# Patient Record
Sex: FEMALE | Race: WHITE | Employment: UNEMPLOYED | URBAN - METROPOLITAN AREA
[De-identification: names, ages, dates, MRNs, and addresses within clinical notes are randomized per-mention and may not be internally consistent; named-entity substitution may affect disease eponyms.]

---
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
## 2023-10-22 ENCOUNTER — HOSPITAL ENCOUNTER (EMERGENCY)
Age: 18
Discharge: HOME | End: 2023-10-22
Attending: STUDENT IN AN ORGANIZED HEALTH CARE EDUCATION/TRAINING PROGRAM

## 2023-10-22 VITALS
DIASTOLIC BLOOD PRESSURE: 63 MMHG | WEIGHT: 146 LBS | BODY MASS INDEX: 24.32 KG/M2 | RESPIRATION RATE: 16 BRPM | OXYGEN SATURATION: 100 % | HEART RATE: 58 BPM | HEIGHT: 65 IN | TEMPERATURE: 97.7 F | SYSTOLIC BLOOD PRESSURE: 106 MMHG

## 2023-10-22 DIAGNOSIS — O21.9 NAUSEA AND VOMITING IN PREGNANCY: Primary | ICD-10-CM

## 2023-10-22 LAB
ALBUMIN SERPL-MCNC: 4.6 G/DL (ref 3.4–5)
ALBUMIN/GLOB SERPL: 1.5 {RATIO} (ref 1.1–2.2)
ALP SERPL-CCNC: 48 U/L (ref 40–129)
ALT SERPL-CCNC: 31 U/L (ref 10–40)
ANION GAP SERPL CALCULATED.3IONS-SCNC: 17 MMOL/L (ref 3–16)
AST SERPL-CCNC: 23 U/L (ref 15–37)
B-HCG SERPL EIA 3RD IS-ACNC: NORMAL MIU/ML
BASOPHILS # BLD: 0 K/UL (ref 0–0.2)
BASOPHILS NFR BLD: 0.4 %
BILIRUB SERPL-MCNC: 0.7 MG/DL (ref 0–1)
BILIRUB UR QL STRIP.AUTO: NEGATIVE
BUN SERPL-MCNC: 13 MG/DL (ref 7–20)
CALCIUM SERPL-MCNC: 9.7 MG/DL (ref 8.3–10.6)
CHLORIDE SERPL-SCNC: 100 MMOL/L (ref 99–110)
CLARITY UR: CLEAR
CO2 SERPL-SCNC: 18 MMOL/L (ref 21–32)
COLOR UR: YELLOW
CREAT SERPL-MCNC: 0.6 MG/DL (ref 0.6–1.1)
DEPRECATED RDW RBC AUTO: 13.5 % (ref 12.4–15.4)
EOSINOPHIL # BLD: 0 K/UL (ref 0–0.6)
EOSINOPHIL NFR BLD: 0.5 %
GFR SERPLBLD CREATININE-BSD FMLA CKD-EPI: >60 ML/MIN/{1.73_M2}
GLUCOSE SERPL-MCNC: 91 MG/DL (ref 70–99)
GLUCOSE UR STRIP.AUTO-MCNC: NEGATIVE MG/DL
HCG SERPL QL: POSITIVE
HCT VFR BLD AUTO: 41.8 % (ref 36–48)
HGB BLD-MCNC: 14.1 G/DL (ref 12–16)
HGB UR QL STRIP.AUTO: NEGATIVE
KETONES UR STRIP.AUTO-MCNC: >=80 MG/DL
LEUKOCYTE ESTERASE UR QL STRIP.AUTO: NEGATIVE
LIPASE SERPL-CCNC: 20 U/L (ref 13–60)
LYMPHOCYTES # BLD: 1.4 K/UL (ref 1–5.1)
LYMPHOCYTES NFR BLD: 14 %
MCH RBC QN AUTO: 30 PG (ref 26–34)
MCHC RBC AUTO-ENTMCNC: 33.8 G/DL (ref 31–36)
MCV RBC AUTO: 88.8 FL (ref 80–100)
MONOCYTES # BLD: 0.5 K/UL (ref 0–1.3)
MONOCYTES NFR BLD: 4.7 %
MUCOUS THREADS #/AREA URNS LPF: ABNORMAL /LPF
NEUTROPHILS # BLD: 7.8 K/UL (ref 1.7–7.7)
NEUTROPHILS NFR BLD: 80.4 %
NITRITE UR QL STRIP.AUTO: NEGATIVE
PH UR STRIP.AUTO: 6 [PH] (ref 5–8)
PLATELET # BLD AUTO: 252 K/UL (ref 135–450)
PMV BLD AUTO: 9.1 FL (ref 5–10.5)
POTASSIUM SERPL-SCNC: 4 MMOL/L (ref 3.5–5.1)
PROT SERPL-MCNC: 7.6 G/DL (ref 6.4–8.2)
PROT UR STRIP.AUTO-MCNC: ABNORMAL MG/DL
RBC # BLD AUTO: 4.71 M/UL (ref 4–5.2)
RBC #/AREA URNS HPF: ABNORMAL /HPF (ref 0–4)
SODIUM SERPL-SCNC: 135 MMOL/L (ref 136–145)
SP GR UR STRIP.AUTO: >=1.03 (ref 1–1.03)
UA COMPLETE W REFLEX CULTURE PNL UR: ABNORMAL
UA DIPSTICK W REFLEX MICRO PNL UR: YES
URN SPEC COLLECT METH UR: ABNORMAL
UROBILINOGEN UR STRIP-ACNC: 0.2 E.U./DL
WBC # BLD AUTO: 9.7 K/UL (ref 4–11)
WBC #/AREA URNS HPF: ABNORMAL /HPF (ref 0–5)

## 2023-10-22 PROCEDURE — 80053 COMPREHEN METABOLIC PANEL: CPT

## 2023-10-22 PROCEDURE — 84703 CHORIONIC GONADOTROPIN ASSAY: CPT

## 2023-10-22 PROCEDURE — 81001 URINALYSIS AUTO W/SCOPE: CPT

## 2023-10-22 PROCEDURE — 99284 EMERGENCY DEPT VISIT MOD MDM: CPT

## 2023-10-22 PROCEDURE — 84702 CHORIONIC GONADOTROPIN TEST: CPT

## 2023-10-22 PROCEDURE — 85025 COMPLETE CBC W/AUTO DIFF WBC: CPT

## 2023-10-22 PROCEDURE — 6360000002 HC RX W HCPCS: Performed by: STUDENT IN AN ORGANIZED HEALTH CARE EDUCATION/TRAINING PROGRAM

## 2023-10-22 PROCEDURE — 2580000003 HC RX 258: Performed by: STUDENT IN AN ORGANIZED HEALTH CARE EDUCATION/TRAINING PROGRAM

## 2023-10-22 PROCEDURE — 83690 ASSAY OF LIPASE: CPT

## 2023-10-22 PROCEDURE — 96374 THER/PROPH/DIAG INJ IV PUSH: CPT

## 2023-10-22 RX ORDER — SODIUM CHLORIDE, SODIUM LACTATE, POTASSIUM CHLORIDE, AND CALCIUM CHLORIDE .6; .31; .03; .02 G/100ML; G/100ML; G/100ML; G/100ML
1000 INJECTION, SOLUTION INTRAVENOUS ONCE
Status: COMPLETED | OUTPATIENT
Start: 2023-10-22 | End: 2023-10-22

## 2023-10-22 RX ORDER — ONDANSETRON 4 MG/1
4 TABLET, FILM COATED ORAL 3 TIMES DAILY PRN
Qty: 15 TABLET | Refills: 0 | Status: SHIPPED | OUTPATIENT
Start: 2023-10-22

## 2023-10-22 RX ORDER — DEXTROSE MONOHYDRATE 50 MG/ML
500 INJECTION, SOLUTION INTRAVENOUS ONCE
Status: COMPLETED | OUTPATIENT
Start: 2023-10-22 | End: 2023-10-22

## 2023-10-22 RX ORDER — LANOLIN ALCOHOL/MO/W.PET/CERES
25 CREAM (GRAM) TOPICAL DAILY
Qty: 30 TABLET | Refills: 3 | Status: SHIPPED | OUTPATIENT
Start: 2023-10-22

## 2023-10-22 RX ORDER — ONDANSETRON 2 MG/ML
4 INJECTION INTRAMUSCULAR; INTRAVENOUS ONCE
Status: COMPLETED | OUTPATIENT
Start: 2023-10-22 | End: 2023-10-22

## 2023-10-22 RX ADMIN — SODIUM CHLORIDE, POTASSIUM CHLORIDE, SODIUM LACTATE AND CALCIUM CHLORIDE 1000 ML: 600; 310; 30; 20 INJECTION, SOLUTION INTRAVENOUS at 11:24

## 2023-10-22 RX ADMIN — ONDANSETRON 4 MG: 2 INJECTION INTRAMUSCULAR; INTRAVENOUS at 11:21

## 2023-10-22 RX ADMIN — DEXTROSE MONOHYDRATE 500 ML: 50 INJECTION, SOLUTION INTRAVENOUS at 11:27

## 2023-10-22 ASSESSMENT — LIFESTYLE VARIABLES
HOW OFTEN DO YOU HAVE A DRINK CONTAINING ALCOHOL: NEVER
HOW MANY STANDARD DRINKS CONTAINING ALCOHOL DO YOU HAVE ON A TYPICAL DAY: PATIENT DOES NOT DRINK

## 2023-10-22 ASSESSMENT — PAIN - FUNCTIONAL ASSESSMENT: PAIN_FUNCTIONAL_ASSESSMENT: 0-10

## 2023-10-22 ASSESSMENT — PAIN SCALES - GENERAL: PAINLEVEL_OUTOF10: 3

## 2023-10-22 NOTE — ED PROVIDER NOTES
8.0    Protein, UA TRACE (A) Negative mg/dL    Urobilinogen, Urine 0.2 <2.0 E.U./dL    Nitrite, Urine Negative Negative    Leukocyte Esterase, Urine Negative Negative    Microscopic Examination YES     Urine Type NotGiven     Urine Reflex to Culture Not Indicated    HCG Qualitative, Serum   Result Value Ref Range    hCG Qual POSITIVE Detects HCG level >10 MIU/mL   HCG, Quantitative, Pregnancy   Result Value Ref Range    hCG Quant 68761.0 <5.0 mIU/mL   Microscopic Urinalysis   Result Value Ref Range    Mucus, UA 1+ (A) None Seen /LPF    WBC, UA 0-2 0 - 5 /HPF    RBC, UA 0-2 0 - 4 /HPF         - Patient seen and evaluated in room 06.  19 y.o. female presented for nausea in setting of presumed pregnancy. Patient presented with normal vitals. On exam she had no abdominal tenderness, no rebound or guarding. She did have dry mucous membranes. Given history and exam my differential diagnosis includes but is not limited to hyperemesis gravidarum, dehydration, electrolyte abnormalities, UTI. She has no vaginal bleeding, no pelvic pain to suggest underlying ruptured ectopic, ovarian torsion, PID or acute surgical abdomen. She had no rebound or guarding. I did not obtain an US given no abdominal pain, no vaginal bleeding or discharge. - Patient was placed on telemetry during her ED stay and no malignant dysrhythmia observed. - Pertinent old records reviewed. - Chronic medical conditions: Depression, PTSD  - Social determinants: None significant  - Patient was given 1 L of IV fluid, 5% dextrose, 4 mg IV Zofran in the ED. Upon reassessment, patient with symptomatic improvement. She was updated with results. No additional concerns. Able to tolerated PO.   - Diagnostic studies reviewed.    - Lab:   CBC with normal WBC, no evidence of anemia, normal platelets  CMP with mild hyponatremia 135, normal K and Cl, elevated anion gap 17, normal renal function, normal LFTs and bilirubin  HCG positive  HCG quant 14,256    - I

## 2023-10-22 NOTE — ED NOTES
Writer reviewed discharge instructions, medications, and follow-up with PCP. patient verbalized understanding. Patient's IV removed with no complications with dressing in place. Patient stable, ambulatory with steady gait, GCS 15, no signs/ symptoms of acute distress, respirations unlabored, and with friend. Patient discharged with documented belongings.       Silvia Vargas RN  10/22/23 5418

## 2023-10-26 ENCOUNTER — OFFICE VISIT (OUTPATIENT)
Dept: OBGYN CLINIC | Age: 18
End: 2023-10-26

## 2023-10-26 VITALS
WEIGHT: 140.6 LBS | SYSTOLIC BLOOD PRESSURE: 102 MMHG | BODY MASS INDEX: 23.4 KG/M2 | TEMPERATURE: 98.2 F | DIASTOLIC BLOOD PRESSURE: 70 MMHG | HEART RATE: 100 BPM

## 2023-10-26 DIAGNOSIS — Z32.01 POSITIVE PREGNANCY TEST: ICD-10-CM

## 2023-10-26 DIAGNOSIS — N91.2 AMENORRHEA: Primary | ICD-10-CM

## 2023-10-26 PROCEDURE — 99203 OFFICE O/P NEW LOW 30 MIN: CPT | Performed by: OBSTETRICS & GYNECOLOGY

## 2023-10-26 PROCEDURE — 81025 URINE PREGNANCY TEST: CPT | Performed by: OBSTETRICS & GYNECOLOGY

## 2023-10-26 ASSESSMENT — SOCIAL DETERMINANTS OF HEALTH (SDOH)
WITHIN THE LAST YEAR, HAVE YOU BEEN HUMILIATED OR EMOTIONALLY ABUSED IN OTHER WAYS BY YOUR PARTNER OR EX-PARTNER?: NO
WITHIN THE LAST YEAR, HAVE YOU BEEN AFRAID OF YOUR PARTNER OR EX-PARTNER?: NO
WITHIN THE LAST YEAR, HAVE TO BEEN RAPED OR FORCED TO HAVE ANY KIND OF SEXUAL ACTIVITY BY YOUR PARTNER OR EX-PARTNER?: NO
WITHIN THE LAST YEAR, HAVE YOU BEEN KICKED, HIT, SLAPPED, OR OTHERWISE PHYSICALLY HURT BY YOUR PARTNER OR EX-PARTNER?: NO

## 2023-10-26 ASSESSMENT — ENCOUNTER SYMPTOMS
NAUSEA: 1
VOMITING: 1
SHORTNESS OF BREATH: 0

## 2023-10-26 NOTE — PROGRESS NOTES
[facility] Ob/Gyn   Amenorrhea  CC:   Chief Complaint   Patient presents with    New Patient    Amenorrhea    Pregnancy Ultrasound        HPI:  19 y.o.  presents for her gynecologic exam.   She complains of Amenorrhea. Patient's last menstrual period was 2023. Ronald Leach Has had a (+) home pregnancy test. Denies any VB / Cramping since that time. + nausea/vomiting but improved with Zofran and Vitamin B6. + prenatal vitamin. Previous pregnancies: nulligravida      Screening:  Last pap smear: not indicated   History of abnormal pap smears: not indicated   STI History: history of BV, denies herpes       Primary Care Physician: Vashti Ji, APRN - CNP    Obstetric History  OB History    Para Term  AB Living   0             SAB IAB Ectopic Molar Multiple Live Births                     Gynecologic History  Menstrual History:  Menarche: 10  LMP: 2023  Menstrual Period: irregular    Sexual History:  Contraception: see above  Currently  sexually active  no sexual problems    Medical History:  Past Medical History:   Diagnosis Date    Depression     Major Depressive Disorder    PTSD (post-traumatic stress disorder)        Medications:  Current Outpatient Medications   Medication Sig Dispense Refill    vitamin B-6 (PYRIDOXINE) 50 MG tablet Take 0.5 tablets by mouth daily 30 tablet 3    ondansetron (ZOFRAN) 4 MG tablet Take 1 tablet by mouth 3 times daily as needed for Nausea or Vomiting 15 tablet 0    levonorgestrel-ethinyl estradiol (SEASONALE) 0.15-0.03 MG per tablet TAKE 1 TABLET BY MOUTH EVERY DAY (Patient not taking: Reported on 10/22/2023)       No current facility-administered medications for this visit. Surgical History:  No past surgical history on file.     Allergies:  No Known Allergies    Family History:  Family History   Problem Relation Age of Onset    Ovarian Cancer Mother     Cancer Mother        Social History:  Social History     Socioeconomic History    Marital status:

## 2023-10-27 ENCOUNTER — TELEPHONE (OUTPATIENT)
Dept: OBGYN CLINIC | Age: 18
End: 2023-10-27

## 2023-10-27 LAB
BACTERIA URNS QL MICRO: ABNORMAL /HPF
BILIRUB UR QL STRIP.AUTO: NEGATIVE
C TRACH DNA CVX QL NAA+PROBE: NEGATIVE
CANDIDA DNA VAG QL NAA+PROBE: ABNORMAL
CHARACTER UR: ABNORMAL
CLARITY UR: CLEAR
COLOR UR: YELLOW
EPI CELLS #/AREA URNS HPF: ABNORMAL /HPF (ref 0–5)
G VAGINALIS DNA SPEC QL NAA+PROBE: ABNORMAL
GLUCOSE UR STRIP.AUTO-MCNC: NEGATIVE MG/DL
HGB UR QL STRIP.AUTO: NEGATIVE
KETONES UR STRIP.AUTO-MCNC: 40 MG/DL
LEUKOCYTE ESTERASE UR QL STRIP.AUTO: ABNORMAL
N GONORRHOEA DNA CERV MUCUS QL NAA+PROBE: NEGATIVE
NITRITE UR QL STRIP.AUTO: POSITIVE
PH UR STRIP.AUTO: 6 [PH] (ref 5–8)
PROT UR STRIP.AUTO-MCNC: 30 MG/DL
RBC #/AREA URNS HPF: ABNORMAL /HPF (ref 0–4)
SP GR UR STRIP.AUTO: 1.02 (ref 1–1.03)
T VAGINALIS DNA VAG QL NAA+PROBE: ABNORMAL
UA DIPSTICK W REFLEX MICRO PNL UR: YES
URN SPEC COLLECT METH UR: ABNORMAL
UROBILINOGEN UR STRIP-ACNC: 0.2 E.U./DL
WBC #/AREA URNS HPF: ABNORMAL /HPF (ref 0–5)

## 2023-10-29 LAB
BACTERIA UR CULT: ABNORMAL
ORGANISM: ABNORMAL

## 2023-10-29 RX ORDER — CEPHALEXIN 500 MG/1
500 CAPSULE ORAL 2 TIMES DAILY
Qty: 14 CAPSULE | Refills: 0 | OUTPATIENT
Start: 2023-10-29 | End: 2023-11-05

## 2023-10-30 ENCOUNTER — TELEPHONE (OUTPATIENT)
Dept: OBGYN CLINIC | Age: 18
End: 2023-10-30

## 2023-10-30 RX ORDER — ONDANSETRON 4 MG/1
4 TABLET, ORALLY DISINTEGRATING ORAL EVERY 8 HOURS PRN
Qty: 20 TABLET | Refills: 1 | Status: SHIPPED | OUTPATIENT
Start: 2023-10-30

## 2023-10-30 NOTE — TELEPHONE ENCOUNTER
Pt calling to request a refill of the zofran that was given to her in the ED. Pended.  Please sign or advise

## 2023-11-15 ENCOUNTER — TELEPHONE (OUTPATIENT)
Dept: OTHER | Age: 18
End: 2023-11-15

## 2023-11-15 NOTE — TELEPHONE ENCOUNTER
MARK-LAUREN attempted to return phone call to patient on this date. SW left voicemail requesting return call.

## 2023-12-05 ENCOUNTER — INITIAL PRENATAL (OUTPATIENT)
Dept: OBGYN CLINIC | Age: 18
End: 2023-12-05

## 2023-12-05 VITALS
DIASTOLIC BLOOD PRESSURE: 68 MMHG | BODY MASS INDEX: 24.63 KG/M2 | SYSTOLIC BLOOD PRESSURE: 105 MMHG | TEMPERATURE: 98.6 F | WEIGHT: 148 LBS | OXYGEN SATURATION: 99 % | HEART RATE: 112 BPM

## 2023-12-05 DIAGNOSIS — Z34.92 PRENATAL CARE IN SECOND TRIMESTER: Primary | ICD-10-CM

## 2023-12-05 PROCEDURE — 99213 OFFICE O/P EST LOW 20 MIN: CPT | Performed by: OBSTETRICS & GYNECOLOGY

## 2023-12-05 PROCEDURE — 36415 COLL VENOUS BLD VENIPUNCTURE: CPT | Performed by: OBSTETRICS & GYNECOLOGY

## 2023-12-06 LAB
ABO + RH BLD: NORMAL
AMPHETAMINES UR QL SCN>1000 NG/ML: ABNORMAL
BARBITURATES UR QL SCN>200 NG/ML: ABNORMAL
BASOPHILS # BLD: 0.1 K/UL (ref 0–0.2)
BASOPHILS NFR BLD: 0.6 %
BENZODIAZ UR QL SCN>200 NG/ML: ABNORMAL
BLD GP AB SCN SERPL QL: NORMAL
BUPRENORPHINE+NOR UR QL SCN: ABNORMAL
CANNABINOIDS UR QL SCN>50 NG/ML: POSITIVE
COCAINE UR QL SCN: ABNORMAL
DEPRECATED RDW RBC AUTO: 14.3 % (ref 12.4–15.4)
DRUG SCREEN COMMENT UR-IMP: ABNORMAL
EOSINOPHIL # BLD: 0 K/UL (ref 0–0.6)
EOSINOPHIL NFR BLD: 0.1 %
FENTANYL SCREEN, URINE: ABNORMAL
HBV SURFACE AG SERPL QL IA: NORMAL
HCT VFR BLD AUTO: 37.2 % (ref 36–48)
HGB BLD-MCNC: 12.8 G/DL (ref 12–16)
HIV 1+2 AB+HIV1 P24 AG SERPL QL IA: NORMAL
HIV 2 AB SERPL QL IA: NORMAL
HIV1 AB SERPL QL IA: NORMAL
HIV1 P24 AG SERPL QL IA: NORMAL
LYMPHOCYTES # BLD: 1.2 K/UL (ref 1–5.1)
LYMPHOCYTES NFR BLD: 12.7 %
MCH RBC QN AUTO: 30.6 PG (ref 26–34)
MCHC RBC AUTO-ENTMCNC: 34.4 G/DL (ref 31–36)
MCV RBC AUTO: 89 FL (ref 80–100)
METHADONE UR QL SCN>300 NG/ML: ABNORMAL
MONOCYTES # BLD: 0.4 K/UL (ref 0–1.3)
MONOCYTES NFR BLD: 4 %
NEUTROPHILS # BLD: 7.5 K/UL (ref 1.7–7.7)
NEUTROPHILS NFR BLD: 82.6 %
OPIATES UR QL SCN>300 NG/ML: ABNORMAL
OXYCODONE UR QL SCN: ABNORMAL
PCP UR QL SCN>25 NG/ML: ABNORMAL
PH UR STRIP: 5 [PH]
PLATELET # BLD AUTO: 222 K/UL (ref 135–450)
PMV BLD AUTO: 9.3 FL (ref 5–10.5)
RBC # BLD AUTO: 4.19 M/UL (ref 4–5.2)
REAGIN+T PALLIDUM IGG+IGM SERPL-IMP: NORMAL
RUBV IGG SERPL IA-ACNC: 5.9 IU/ML
VZV IGG SER QL IA: NORMAL
WBC # BLD AUTO: 9.1 K/UL (ref 4–11)

## 2023-12-07 LAB
HCV AB S/CO SERPL IA: 0.08 IV
HCV AB SERPL QL IA: NEGATIVE

## 2024-01-03 ENCOUNTER — ROUTINE PRENATAL (OUTPATIENT)
Dept: OBGYN CLINIC | Age: 19
End: 2024-01-03

## 2024-01-03 VITALS
TEMPERATURE: 97.6 F | BODY MASS INDEX: 25.23 KG/M2 | SYSTOLIC BLOOD PRESSURE: 105 MMHG | WEIGHT: 151.6 LBS | DIASTOLIC BLOOD PRESSURE: 70 MMHG | HEART RATE: 95 BPM

## 2024-01-03 DIAGNOSIS — Z87.440 HISTORY OF URINARY TRACT INFECTION: ICD-10-CM

## 2024-01-03 DIAGNOSIS — Z34.92 PRENATAL CARE IN SECOND TRIMESTER: Primary | ICD-10-CM

## 2024-01-03 DIAGNOSIS — Z36.89 ENCOUNTER FOR FETAL ANATOMIC SURVEY: ICD-10-CM

## 2024-01-03 PROCEDURE — 99213 OFFICE O/P EST LOW 20 MIN: CPT | Performed by: OBSTETRICS & GYNECOLOGY

## 2024-01-03 NOTE — PROGRESS NOTES
18 y.o.  at 18w2d EGA Estimated Date of Delivery: 6/3/24 here for IVONNE:     Pt seen and examined. No concerns/complaints. She reports one episode of dizziness and a headache yesterday and that she could not speak. She denies any complaints at this time.   Denies VB, LOF, CTX. Endorses (+) FM. Denies fevers / chills / chest pain / shortness of breath.   Denies HA, changes with vision, RUQ pain, edema.   MWQ reviewed.     Objective:  /70   Pulse 95   Temp 97.6 °F (36.4 °C) (Infrared)   Wt 68.8 kg (151 lb 9.6 oz)   LMP 2023   BMI 25.23 kg/m²   Gen: AO, NAD  Abd: Soft, NT, gravid   Ext: Mild LE edema    OBSTETRIC ULTRASOUND -- SECOND TRIMESTER     DATE:  2024     PHYSICIAN: LEATHA Solis D.O.     SONOGRAPHER: ILYA Shafer RDMS     INDICATION:  Second trimester, Anatomical screening     TYPE OF SCAN: vaginal, abdominal 3.5 MHz 5MHz     FINDINGS:       A single viable intrauterine pregnancy is noted in cephalic presentation. Cardiac and somatic activity are noted.     The following values were obtained:              Fetal heart rate                                               152bpm              BPD                                                                 4.06cm                        52.1 %              Head Circumference                                       15.71cm                      58.8 %               Abdominal Circumference                              13.50cm                      69.9 %              Femur Length                                                  2.81cm                        62.0 %              Humerus Length                                             2.66cm                        62.0 %              Cerebellum                                                      1.92cm                        73.5 %              Amniotic fluid DVP                                          4.46cm              EFW                                                                256g

## 2024-01-06 LAB
BACTERIA UR CULT: ABNORMAL
ORGANISM: ABNORMAL

## 2024-02-07 ENCOUNTER — ROUTINE PRENATAL (OUTPATIENT)
Dept: OBGYN CLINIC | Age: 19
End: 2024-02-07
Payer: COMMERCIAL

## 2024-02-07 VITALS
BODY MASS INDEX: 27.66 KG/M2 | WEIGHT: 166.2 LBS | SYSTOLIC BLOOD PRESSURE: 110 MMHG | HEART RATE: 95 BPM | TEMPERATURE: 97.6 F | DIASTOLIC BLOOD PRESSURE: 68 MMHG

## 2024-02-07 DIAGNOSIS — Z87.440 HISTORY OF URINARY TRACT INFECTION: ICD-10-CM

## 2024-02-07 DIAGNOSIS — Z23 NEED FOR IMMUNIZATION AGAINST INFLUENZA: ICD-10-CM

## 2024-02-07 DIAGNOSIS — Z36.2 ENCOUNTER FOR FOLLOW-UP ULTRASOUND OF FETAL ANATOMY: ICD-10-CM

## 2024-02-07 DIAGNOSIS — O43.112 CIRCUMVALLATE PLACENTA IN SECOND TRIMESTER: ICD-10-CM

## 2024-02-07 DIAGNOSIS — Z34.92 PRENATAL CARE IN SECOND TRIMESTER: Primary | ICD-10-CM

## 2024-02-07 PROCEDURE — 90471 IMMUNIZATION ADMIN: CPT | Performed by: OBSTETRICS & GYNECOLOGY

## 2024-02-07 PROCEDURE — 99213 OFFICE O/P EST LOW 20 MIN: CPT | Performed by: OBSTETRICS & GYNECOLOGY

## 2024-02-07 PROCEDURE — 90674 CCIIV4 VAC NO PRSV 0.5 ML IM: CPT | Performed by: OBSTETRICS & GYNECOLOGY

## 2024-02-07 NOTE — PROGRESS NOTES
10:21 AM Given Flucelvax vaccine 0.5mL IM  Site:Left deltoid.   Lot # [id number]  Expiration Date:  6/30/2024  NDC # 11068-064-03 .  Patient tolerated well. No reaction noted after 20 minutes. VIS sheet provided.  Administered by: Arleth Foster LPN

## 2024-02-07 NOTE — PROGRESS NOTES
18 y.o.  at 23w2d EGA Estimated Date of Delivery: 6/3/24 here for IVONNE:     Pt seen and examined. No concerns/complaints.  Denies VB, LOF, CTX. Endorses (+) FM. Denies fevers / chills / chest pain / shortness of breath.   Denies HA, changes with vision, RUQ pain, edema.   MWQ reviewed.     Objective:  /68   Pulse 95   Temp 97.6 °F (36.4 °C) (Infrared)   Wt 75.4 kg (166 lb 3.2 oz)   LMP 2023   BMI 27.66 kg/m²   Gen: AO, NAD  Abd: Soft, NT, gravid   Ext: Mild LE edema    OBSTETRICAL ULTRASOUND GROWTH     DATE: 24     PHYSICIAN: LEATHA Solis D.O.      SONOGRAPHER: MIGDALIA Trejo RDMS     INDICATION: Growth, F/U anatomy     TYPE OF SCAN: abdominal     FINDINGS:  A single viable intrauterine pregnancy is noted in cephalic presentation. Cardiac and somatic activity are noted.     The following values were obtained:              Fetal heart rate                                               140bpm              BPD                                                                 5.64cm                        42.5 %              Head Circumference                                       21.81cm                      57.2 %               Abdominal Circumference                              18.96cm                      55.4 %              Femur Length                                                  3.96cm                        22.2 %              Amniotic fluid index                                         6.93cm              EFW                                                                586g                44.4 percentile     Amniotic fluid volume is normal. Based on sonographic criteria the estimated fetal age is 23weeks and 3days with EDC of 24. There is a 1 day discordance with the established EDC of 6/3/24.      The patient has an anterior placenta that is adequate distance in relation to the internal cervical os. The evaluation of the lower uterine segment and cervix reveals normal appearing

## 2024-02-08 ENCOUNTER — TELEPHONE (OUTPATIENT)
Dept: OBGYN CLINIC | Age: 19
End: 2024-02-08

## 2024-02-08 NOTE — TELEPHONE ENCOUNTER
Patient called back. Discussed with patient referral to MFM. Patient agreeable and verbalized understanding.

## 2024-03-06 ENCOUNTER — ROUTINE PRENATAL (OUTPATIENT)
Dept: OBGYN CLINIC | Age: 19
End: 2024-03-06
Payer: COMMERCIAL

## 2024-03-06 VITALS
SYSTOLIC BLOOD PRESSURE: 109 MMHG | BODY MASS INDEX: 27.96 KG/M2 | WEIGHT: 168 LBS | TEMPERATURE: 97.6 F | HEART RATE: 96 BPM | DIASTOLIC BLOOD PRESSURE: 65 MMHG

## 2024-03-06 DIAGNOSIS — O43.112 CIRCUMVALLATE PLACENTA IN SECOND TRIMESTER: ICD-10-CM

## 2024-03-06 DIAGNOSIS — Z34.92 PRENATAL CARE IN SECOND TRIMESTER: Primary | ICD-10-CM

## 2024-03-06 DIAGNOSIS — Z87.440 HISTORY OF URINARY TRACT INFECTION: ICD-10-CM

## 2024-03-06 PROCEDURE — 99213 OFFICE O/P EST LOW 20 MIN: CPT | Performed by: OBSTETRICS & GYNECOLOGY

## 2024-03-06 PROCEDURE — 90471 IMMUNIZATION ADMIN: CPT | Performed by: OBSTETRICS & GYNECOLOGY

## 2024-03-06 PROCEDURE — 90715 TDAP VACCINE 7 YRS/> IM: CPT | Performed by: OBSTETRICS & GYNECOLOGY

## 2024-03-06 PROCEDURE — 36415 COLL VENOUS BLD VENIPUNCTURE: CPT | Performed by: OBSTETRICS & GYNECOLOGY

## 2024-03-06 SDOH — ECONOMIC STABILITY: INCOME INSECURITY: HOW HARD IS IT FOR YOU TO PAY FOR THE VERY BASICS LIKE FOOD, HOUSING, MEDICAL CARE, AND HEATING?: NOT VERY HARD

## 2024-03-06 SDOH — ECONOMIC STABILITY: TRANSPORTATION INSECURITY
IN THE PAST 12 MONTHS, HAS LACK OF TRANSPORTATION KEPT YOU FROM MEETINGS, WORK, OR FROM GETTING THINGS NEEDED FOR DAILY LIVING?: NO

## 2024-03-06 SDOH — ECONOMIC STABILITY: FOOD INSECURITY: WITHIN THE PAST 12 MONTHS, YOU WORRIED THAT YOUR FOOD WOULD RUN OUT BEFORE YOU GOT MONEY TO BUY MORE.: NEVER TRUE

## 2024-03-06 SDOH — ECONOMIC STABILITY: FOOD INSECURITY: WITHIN THE PAST 12 MONTHS, THE FOOD YOU BOUGHT JUST DIDN'T LAST AND YOU DIDN'T HAVE MONEY TO GET MORE.: NEVER TRUE

## 2024-03-06 SDOH — ECONOMIC STABILITY: HOUSING INSECURITY
IN THE LAST 12 MONTHS, WAS THERE A TIME WHEN YOU DID NOT HAVE A STEADY PLACE TO SLEEP OR SLEPT IN A SHELTER (INCLUDING NOW)?: NO

## 2024-03-06 NOTE — PROGRESS NOTES
Return OB Office Visit    CC:   Chief Complaint   Patient presents with    Routine Prenatal Visit       HPI:  Pt seen and examined. No concerns/complaints. Denies VB, LOF, ctx. +FM.     Objective:  /65   Pulse 96   Temp 97.6 °F (36.4 °C) (Infrared)   Wt 76.2 kg (168 lb)   LMP 2023   BMI 27.96 kg/m²   Gen: AO, NAD  Abd: Soft, NT  FHT: 145  FH: 26cm, unk by Le[place]    Assessment/Plan:  18 y.o.  at 27w2d (Estimated Date of Delivery: 6/3/24) presents for IVONNE appointment:      Diagnosis Orders   1. Prenatal care in second trimester  Glucose Challenge Gestational    CBC with Auto Differential      2. Circumvallate placenta in second trimester        3. History of urinary tract infection          Doing well today, routine care  - FWB reassuring on doptones today  - has seen MFM for circumvallate placenta, rec q4wk growth US, next in 2 weeks at next visit  - GTT/CBC and Tdap today    Dispo: RTC in 2 weeks with growth US  Kaela Carr MD

## 2024-03-06 NOTE — PROGRESS NOTES
12:02 PM Given Tdap (Adacel) vaccine 0.5mL IM  Site:Left deltoid.   Lot # [id number]  Expiration Date:  5/3/26  NDC # 98552-357-50 .  Patient tolerated well. No reaction noted after 20 minutes. VIS sheet provided.  Administered by: Arleth Foster LPN

## 2024-03-07 LAB
BASOPHILS # BLD: 0.1 K/UL (ref 0–0.2)
BASOPHILS NFR BLD: 0.4 %
DEPRECATED RDW RBC AUTO: 13.8 % (ref 12.4–15.4)
EOSINOPHIL # BLD: 0.1 K/UL (ref 0–0.6)
GLUCOSE 1H P 50 G GLC PO SERPL-MCNC: 72 MG/DL
HGB BLD-MCNC: 11.4 G/DL (ref 12–16)
LYMPHOCYTES # BLD: 1.6 K/UL (ref 1–5.1)
LYMPHOCYTES NFR BLD: 12.1 %
MCH RBC QN AUTO: 29.9 PG (ref 26–34)
MCHC RBC AUTO-ENTMCNC: 33.3 G/DL (ref 31–36)
MCV RBC AUTO: 89.8 FL (ref 80–100)
MONOCYTES NFR BLD: 5.8 %
NEUTROPHILS # BLD: 10.6 K/UL (ref 1.7–7.7)
NEUTROPHILS NFR BLD: 81.1 %
PLATELET # BLD AUTO: 213 K/UL (ref 135–450)
RBC # BLD AUTO: 3.82 M/UL (ref 4–5.2)
WBC # BLD AUTO: 13 K/UL (ref 4–11)

## 2024-03-20 ENCOUNTER — APPOINTMENT (OUTPATIENT)
Dept: ULTRASOUND IMAGING | Age: 19
DRG: 566 | End: 2024-03-20
Payer: COMMERCIAL

## 2024-03-20 ENCOUNTER — ROUTINE PRENATAL (OUTPATIENT)
Dept: OBGYN CLINIC | Age: 19
End: 2024-03-20
Payer: COMMERCIAL

## 2024-03-20 ENCOUNTER — HOSPITAL ENCOUNTER (OUTPATIENT)
Age: 19
Setting detail: OBSERVATION
Discharge: HOME | DRG: 566 | End: 2024-03-22
Attending: OBSTETRICS & GYNECOLOGY | Admitting: OBSTETRICS & GYNECOLOGY
Payer: COMMERCIAL

## 2024-03-20 VITALS
SYSTOLIC BLOOD PRESSURE: 121 MMHG | WEIGHT: 171 LBS | HEART RATE: 98 BPM | BODY MASS INDEX: 28.46 KG/M2 | DIASTOLIC BLOOD PRESSURE: 70 MMHG | TEMPERATURE: 97.3 F

## 2024-03-20 DIAGNOSIS — R10.9 RIGHT FLANK PAIN: ICD-10-CM

## 2024-03-20 DIAGNOSIS — O43.113 CIRCUMVALLATE PLACENTA IN THIRD TRIMESTER: ICD-10-CM

## 2024-03-20 DIAGNOSIS — Z87.440 HISTORY OF URINARY TRACT INFECTION: ICD-10-CM

## 2024-03-20 DIAGNOSIS — Z34.93 PRENATAL CARE IN THIRD TRIMESTER: Primary | ICD-10-CM

## 2024-03-20 DIAGNOSIS — O23.03 PYELONEPHRITIS AFFECTING PREGNANCY IN THIRD TRIMESTER: Primary | ICD-10-CM

## 2024-03-20 PROBLEM — O23.00 PYELONEPHRITIS DURING PREGNANCY: Status: ACTIVE | Noted: 2024-03-20

## 2024-03-20 LAB
ABO + RH BLD: NORMAL
ALBUMIN SERPL-MCNC: 3.4 G/DL (ref 3.4–5)
ALBUMIN/GLOB SERPL: 1.1 {RATIO} (ref 1.1–2.2)
ALP SERPL-CCNC: 67 U/L (ref 40–129)
ALT SERPL-CCNC: 16 U/L (ref 10–40)
ANION GAP SERPL CALCULATED.3IONS-SCNC: 14 MMOL/L (ref 3–16)
AST SERPL-CCNC: 16 U/L (ref 15–37)
BACTERIA URNS QL MICRO: ABNORMAL /HPF
BILIRUB SERPL-MCNC: <0.2 MG/DL (ref 0–1)
BILIRUB UR QL STRIP.AUTO: NEGATIVE
BLD GP AB SCN SERPL QL: NORMAL
BUN SERPL-MCNC: 12 MG/DL (ref 7–20)
CALCIUM SERPL-MCNC: 8.3 MG/DL (ref 8.3–10.6)
CHLORIDE SERPL-SCNC: 102 MMOL/L (ref 99–110)
CLARITY UR: ABNORMAL
CO2 SERPL-SCNC: 19 MMOL/L (ref 21–32)
COLOR UR: YELLOW
CREAT SERPL-MCNC: <0.5 MG/DL (ref 0.6–1.1)
DEPRECATED RDW RBC AUTO: 13.6 % (ref 12.4–15.4)
EPI CELLS #/AREA URNS HPF: ABNORMAL /HPF (ref 0–5)
GFR SERPLBLD CREATININE-BSD FMLA CKD-EPI: >60 ML/MIN/{1.73_M2}
GLUCOSE SERPL-MCNC: 73 MG/DL (ref 70–99)
GLUCOSE UR STRIP.AUTO-MCNC: NEGATIVE MG/DL
HCT VFR BLD AUTO: 35.5 % (ref 36–48)
HGB BLD-MCNC: 11.6 G/DL (ref 12–16)
HGB UR QL STRIP.AUTO: ABNORMAL
KETONES UR STRIP.AUTO-MCNC: 40 MG/DL
LEUKOCYTE ESTERASE UR QL STRIP.AUTO: ABNORMAL
MCH RBC QN AUTO: 29.2 PG (ref 26–34)
MCHC RBC AUTO-ENTMCNC: 32.7 G/DL (ref 31–36)
MCV RBC AUTO: 89.1 FL (ref 80–100)
NITRITE UR QL STRIP.AUTO: NEGATIVE
PH UR STRIP.AUTO: 7 [PH] (ref 5–8)
PLATELET # BLD AUTO: 171 K/UL (ref 135–450)
PMV BLD AUTO: 8.9 FL (ref 5–10.5)
POTASSIUM SERPL-SCNC: 3.5 MMOL/L (ref 3.5–5.1)
PROT SERPL-MCNC: 6.4 G/DL (ref 6.4–8.2)
PROT UR STRIP.AUTO-MCNC: NEGATIVE MG/DL
RBC # BLD AUTO: 3.98 M/UL (ref 4–5.2)
RBC #/AREA URNS HPF: ABNORMAL /HPF (ref 0–4)
SODIUM SERPL-SCNC: 135 MMOL/L (ref 136–145)
SP GR UR STRIP.AUTO: 1.02 (ref 1–1.03)
UA DIPSTICK W REFLEX MICRO PNL UR: YES
URN SPEC COLLECT METH UR: ABNORMAL
UROBILINOGEN UR STRIP-ACNC: 0.2 E.U./DL
WBC # BLD AUTO: 16.8 K/UL (ref 4–11)
WBC #/AREA URNS HPF: ABNORMAL /HPF (ref 0–5)
YEAST URNS QL MICRO: PRESENT /HPF

## 2024-03-20 PROCEDURE — 96366 THER/PROPH/DIAG IV INF ADDON: CPT

## 2024-03-20 PROCEDURE — 36415 COLL VENOUS BLD VENIPUNCTURE: CPT

## 2024-03-20 PROCEDURE — 99211 OFF/OP EST MAY X REQ PHY/QHP: CPT

## 2024-03-20 PROCEDURE — 99213 OFFICE O/P EST LOW 20 MIN: CPT | Performed by: OBSTETRICS & GYNECOLOGY

## 2024-03-20 PROCEDURE — 6370000000 HC RX 637 (ALT 250 FOR IP): Performed by: OBSTETRICS & GYNECOLOGY

## 2024-03-20 PROCEDURE — 80053 COMPREHEN METABOLIC PANEL: CPT

## 2024-03-20 PROCEDURE — 96376 TX/PRO/DX INJ SAME DRUG ADON: CPT

## 2024-03-20 PROCEDURE — 1220000000 HC SEMI PRIVATE OB R&B

## 2024-03-20 PROCEDURE — 86901 BLOOD TYPING SEROLOGIC RH(D): CPT

## 2024-03-20 PROCEDURE — 86900 BLOOD TYPING SEROLOGIC ABO: CPT

## 2024-03-20 PROCEDURE — 59025 FETAL NON-STRESS TEST: CPT | Performed by: OBSTETRICS & GYNECOLOGY

## 2024-03-20 PROCEDURE — 99221 1ST HOSP IP/OBS SF/LOW 40: CPT | Performed by: OBSTETRICS & GYNECOLOGY

## 2024-03-20 PROCEDURE — 85027 COMPLETE CBC AUTOMATED: CPT

## 2024-03-20 PROCEDURE — 81001 URINALYSIS AUTO W/SCOPE: CPT

## 2024-03-20 PROCEDURE — 6360000002 HC RX W HCPCS: Performed by: OBSTETRICS & GYNECOLOGY

## 2024-03-20 PROCEDURE — 96365 THER/PROPH/DIAG IV INF INIT: CPT

## 2024-03-20 PROCEDURE — G0378 HOSPITAL OBSERVATION PER HR: HCPCS

## 2024-03-20 PROCEDURE — 76770 US EXAM ABDO BACK WALL COMP: CPT

## 2024-03-20 PROCEDURE — 2580000003 HC RX 258: Performed by: OBSTETRICS & GYNECOLOGY

## 2024-03-20 PROCEDURE — 86850 RBC ANTIBODY SCREEN: CPT

## 2024-03-20 RX ORDER — HYDROXYZINE PAMOATE 25 MG/1
25 CAPSULE ORAL 3 TIMES DAILY PRN
Status: DISCONTINUED | OUTPATIENT
Start: 2024-03-20 | End: 2024-03-23 | Stop reason: HOSPADM

## 2024-03-20 RX ORDER — SODIUM CHLORIDE, SODIUM LACTATE, POTASSIUM CHLORIDE, CALCIUM CHLORIDE 600; 310; 30; 20 MG/100ML; MG/100ML; MG/100ML; MG/100ML
INJECTION, SOLUTION INTRAVENOUS CONTINUOUS
Status: DISCONTINUED | OUTPATIENT
Start: 2024-03-20 | End: 2024-03-23 | Stop reason: HOSPADM

## 2024-03-20 RX ORDER — OXYCODONE HYDROCHLORIDE 5 MG/1
5 TABLET ORAL EVERY 4 HOURS PRN
Status: DISCONTINUED | OUTPATIENT
Start: 2024-03-20 | End: 2024-03-23 | Stop reason: HOSPADM

## 2024-03-20 RX ORDER — ONDANSETRON 2 MG/ML
4 INJECTION INTRAMUSCULAR; INTRAVENOUS EVERY 6 HOURS PRN
Status: DISCONTINUED | OUTPATIENT
Start: 2024-03-20 | End: 2024-03-23 | Stop reason: HOSPADM

## 2024-03-20 RX ORDER — ONDANSETRON 4 MG/1
4 TABLET, ORALLY DISINTEGRATING ORAL EVERY 8 HOURS PRN
Status: DISCONTINUED | OUTPATIENT
Start: 2024-03-20 | End: 2024-03-23 | Stop reason: HOSPADM

## 2024-03-20 RX ORDER — OXYCODONE HYDROCHLORIDE 5 MG/1
10 TABLET ORAL EVERY 4 HOURS PRN
Status: DISCONTINUED | OUTPATIENT
Start: 2024-03-20 | End: 2024-03-23 | Stop reason: HOSPADM

## 2024-03-20 RX ORDER — ACETAMINOPHEN 500 MG
1000 TABLET ORAL EVERY 8 HOURS PRN
Status: DISCONTINUED | OUTPATIENT
Start: 2024-03-20 | End: 2024-03-23 | Stop reason: HOSPADM

## 2024-03-20 RX ADMIN — SODIUM CHLORIDE, POTASSIUM CHLORIDE, SODIUM LACTATE AND CALCIUM CHLORIDE: 600; 310; 30; 20 INJECTION, SOLUTION INTRAVENOUS at 18:25

## 2024-03-20 RX ADMIN — CEFTRIAXONE SODIUM 1000 MG: 1 INJECTION, POWDER, FOR SOLUTION INTRAMUSCULAR; INTRAVENOUS at 18:46

## 2024-03-20 RX ADMIN — OXYCODONE 10 MG: 5 TABLET ORAL at 18:30

## 2024-03-20 RX ADMIN — CEFTRIAXONE SODIUM 1000 MG: 1 INJECTION, POWDER, FOR SOLUTION INTRAMUSCULAR; INTRAVENOUS at 18:33

## 2024-03-20 RX ADMIN — ACETAMINOPHEN 1000 MG: 500 TABLET ORAL at 19:25

## 2024-03-20 ASSESSMENT — PAIN SCALES - GENERAL
PAINLEVEL_OUTOF10: 8
PAINLEVEL_OUTOF10: 0

## 2024-03-20 ASSESSMENT — PAIN - FUNCTIONAL ASSESSMENT: PAIN_FUNCTIONAL_ASSESSMENT: ACTIVITIES ARE NOT PREVENTED

## 2024-03-20 ASSESSMENT — PAIN DESCRIPTION - DESCRIPTORS: DESCRIPTORS: DISCOMFORT

## 2024-03-20 ASSESSMENT — PAIN DESCRIPTION - LOCATION: LOCATION: BACK

## 2024-03-20 NOTE — H&P
Problem Relation Age of Onset    Ovarian Cancer Mother     Cancer Mother      Social History:  Social History     Substance and Sexual Activity   Alcohol Use Yes     Social History     Substance and Sexual Activity   Drug Use Yes    Types: Marijuana (Weed)     Social History     Tobacco Use   Smoking Status Never   Smokeless Tobacco Never     Physical Exam:  /62   Pulse (!) 108   Temp 98.2 °F (36.8 °C) (Oral)   Resp 18   LMP 08/28/2023   SpO2 98%   General: Alert, well appearing, uncomfortable during exam  Head: Normocephalic, atraumatic  Lungs: Clear to auscultation bilaterally without rales, rhonchi, wheezing  CV: Regular rate and regular rhythm, normal S1, S2 without murmurs, rubs, clicks or gallops.  Abdomen: Gravid, soft, non-tender, non-distended.  No rebound or guarding.    Musculoskeletal: right CVA tenderness  Extremities: No redness or tenderness, neg Yasmeen's sign  Skin: Well perfused, normal coloration and turgor, no lesions or rashes visualized  Neuro: Alert, oriented, normal speech, no focal deficits, moves extremities appropriately  Psych: Appropriate, normal affect, appears stated age    Fetal Monitoring Interpretation:   Baseline: 145  Variability: moderate in degree  Accelerations: Present  Decelerations: Absent                        [place]: Contractions are absent     Category 1    Reactive: Yes      Labs:  Admission on 03/20/2024   Component Date Value    WBC 03/20/2024 16.8 (H)     RBC 03/20/2024 3.98 (L)     Hemoglobin 03/20/2024 11.6 (L)     Hematocrit 03/20/2024 35.5 (L)     MCV 03/20/2024 89.1     MCH 03/20/2024 29.2     MCHC 03/20/2024 32.7     RDW 03/20/2024 13.6     Platelets 03/20/2024 171     MPV 03/20/2024 8.9     ABO/Rh 03/20/2024 O POS     Antibody Screen 03/20/2024 NEG     Sodium 03/20/2024 135 (L)     Potassium 03/20/2024 3.5     Chloride 03/20/2024 102     CO2 03/20/2024 19 (L)     Anion Gap 03/20/2024 14     Glucose 03/20/2024 73     BUN 03/20/2024 12     Creatinine  2024 <0.5 (L)     Est, Glom Filt Rate 2024 >60     Calcium 2024 8.3     Total Protein 2024 6.4     Albumin 2024 3.4     Albumin/Globulin Ratio 2024 1.1     Total Bilirubin 2024 <0.2     Alkaline Phosphatase 2024 67     ALT 2024 16     AST 2024 16     Color, UA 2024 Yellow     Clarity, UA 2024 SL CLOUDY (A)     Glucose, Ur 2024 Negative     Bilirubin Urine 2024 Negative     Ketones, Urine 2024 40 (A)     Specific [place], UA 2024 1.025     Blood, Urine 2024 SMALL (A)     pH, UA 2024 7.0     Protein, UA 2024 Negative     Urobilinogen, Urine 2024 0.2     Nitrite, Urine 2024 Negative     Leukocyte Esterase, Urine 2024 LARGE (A)     Microscopic Examination 2024 YES     Urine Type 2024 NotGiven     WBC, UA 2024 21-50 (A)     RBC, UA 2024 3-4     Epithelial Cells, UA 2024 21-50 (A)     Bacteria, UA 2024 3+ (A)     Yeast, UA 2024 Present (A)        Assessment/Plan:     Merissa Whipple is a 18 y.o.  at 29w2d     Right flank pain - suspect pyelonephritis vs nephrolithiasis     - Right flank pain and CVA tenderness     - Urinalysis with yeast and positive leukocyte esterase with pyuria, small blood in urine     - Leukocytosis with WBC 16.8     - Renal US showing mild right sided hydronephrosis - bladder is empty with inability to identify ureteral jets     - Reviewed with UA findings and mild leukocytosis will treat with Rocephin for suspected Pyelonephritis     - Pain control with Oxycodone and Nubain if needed     - IV fluids     - Strain urine     - Repeat labs in AM     O pos     Yeast infection     - Will plan for Terazol    Hx of UTI in first trimester     - Klebsiella UTI in first trimester     Circumvallate placenta    Fetal wellbeing     - Reassuring     - NST q shift    Disposition:   In house for abx and pain control.     Marla Monroy

## 2024-03-20 NOTE — PROGRESS NOTES
Pt to triage from office for renal evaluation. Orders placed, labs and urine sent, vitals Blood pressure 117/62, pulse (!) 108, temperature 98.2 °F (36.8 °C), temperature source Oral, resp. rate 18, last menstrual period 08/28/2023, SpO2 98 %, not currently breastfeeding.  Pt placed on monitor.

## 2024-03-21 PROBLEM — O23.03 PYELONEPHRITIS AFFECTING PREGNANCY IN THIRD TRIMESTER: Status: ACTIVE | Noted: 2024-03-21

## 2024-03-21 LAB
BASOPHILS # BLD: 0 K/UL (ref 0–0.2)
BASOPHILS NFR BLD: 0.2 %
DEPRECATED RDW RBC AUTO: 13.9 % (ref 12.4–15.4)
EOSINOPHIL # BLD: 0 K/UL (ref 0–0.6)
EOSINOPHIL NFR BLD: 0.1 %
HCT VFR BLD AUTO: 32.3 % (ref 36–48)
HGB BLD-MCNC: 10.8 G/DL (ref 12–16)
LYMPHOCYTES # BLD: 0.6 K/UL (ref 1–5.1)
LYMPHOCYTES NFR BLD: 3.6 %
MCH RBC QN AUTO: 29.9 PG (ref 26–34)
MCHC RBC AUTO-ENTMCNC: 33.3 G/DL (ref 31–36)
MCV RBC AUTO: 89.8 FL (ref 80–100)
MONOCYTES # BLD: 0.7 K/UL (ref 0–1.3)
MONOCYTES NFR BLD: 4.5 %
NEUTROPHILS # BLD: 15.3 K/UL (ref 1.7–7.7)
NEUTROPHILS NFR BLD: 91.6 %
PLATELET # BLD AUTO: 164 K/UL (ref 135–450)
PMV BLD AUTO: 9.6 FL (ref 5–10.5)
RBC # BLD AUTO: 3.6 M/UL (ref 4–5.2)
WBC # BLD AUTO: 16.7 K/UL (ref 4–11)

## 2024-03-21 PROCEDURE — 99232 SBSQ HOSP IP/OBS MODERATE 35: CPT | Performed by: OBSTETRICS & GYNECOLOGY

## 2024-03-21 PROCEDURE — 36415 COLL VENOUS BLD VENIPUNCTURE: CPT

## 2024-03-21 PROCEDURE — 6370000000 HC RX 637 (ALT 250 FOR IP): Performed by: OBSTETRICS & GYNECOLOGY

## 2024-03-21 PROCEDURE — 96366 THER/PROPH/DIAG IV INF ADDON: CPT

## 2024-03-21 PROCEDURE — 2580000003 HC RX 258: Performed by: OBSTETRICS & GYNECOLOGY

## 2024-03-21 PROCEDURE — 6360000002 HC RX W HCPCS: Performed by: OBSTETRICS & GYNECOLOGY

## 2024-03-21 PROCEDURE — 96376 TX/PRO/DX INJ SAME DRUG ADON: CPT

## 2024-03-21 PROCEDURE — 85025 COMPLETE CBC W/AUTO DIFF WBC: CPT

## 2024-03-21 PROCEDURE — 1220000000 HC SEMI PRIVATE OB R&B

## 2024-03-21 PROCEDURE — G0378 HOSPITAL OBSERVATION PER HR: HCPCS

## 2024-03-21 PROCEDURE — 96361 HYDRATE IV INFUSION ADD-ON: CPT

## 2024-03-21 RX ADMIN — ACETAMINOPHEN 1000 MG: 500 TABLET ORAL at 18:10

## 2024-03-21 RX ADMIN — SODIUM CHLORIDE, POTASSIUM CHLORIDE, SODIUM LACTATE AND CALCIUM CHLORIDE: 600; 310; 30; 20 INJECTION, SOLUTION INTRAVENOUS at 18:09

## 2024-03-21 RX ADMIN — OXYCODONE 10 MG: 5 TABLET ORAL at 22:07

## 2024-03-21 RX ADMIN — OXYCODONE 10 MG: 5 TABLET ORAL at 02:45

## 2024-03-21 RX ADMIN — OXYCODONE 10 MG: 5 TABLET ORAL at 14:25

## 2024-03-21 RX ADMIN — SODIUM CHLORIDE, POTASSIUM CHLORIDE, SODIUM LACTATE AND CALCIUM CHLORIDE: 600; 310; 30; 20 INJECTION, SOLUTION INTRAVENOUS at 02:47

## 2024-03-21 RX ADMIN — ACETAMINOPHEN 1000 MG: 500 TABLET ORAL at 02:45

## 2024-03-21 RX ADMIN — OXYCODONE 10 MG: 5 TABLET ORAL at 08:10

## 2024-03-21 RX ADMIN — SODIUM CHLORIDE, POTASSIUM CHLORIDE, SODIUM LACTATE AND CALCIUM CHLORIDE: 600; 310; 30; 20 INJECTION, SOLUTION INTRAVENOUS at 10:57

## 2024-03-21 RX ADMIN — TERCONAZOLE 1 APPLICATOR: 4 CREAM VAGINAL at 22:07

## 2024-03-21 RX ADMIN — CEFTRIAXONE SODIUM 1000 MG: 1 INJECTION, POWDER, FOR SOLUTION INTRAMUSCULAR; INTRAVENOUS at 06:12

## 2024-03-21 RX ADMIN — OXYCODONE 10 MG: 5 TABLET ORAL at 18:10

## 2024-03-21 RX ADMIN — CEFTRIAXONE SODIUM 1000 MG: 1 INJECTION, POWDER, FOR SOLUTION INTRAMUSCULAR; INTRAVENOUS at 18:16

## 2024-03-21 RX ADMIN — ACETAMINOPHEN 1000 MG: 500 TABLET ORAL at 10:45

## 2024-03-21 ASSESSMENT — PAIN - FUNCTIONAL ASSESSMENT: PAIN_FUNCTIONAL_ASSESSMENT: ACTIVITIES ARE NOT PREVENTED

## 2024-03-21 ASSESSMENT — PAIN DESCRIPTION - LOCATION
LOCATION: ABDOMEN
LOCATION: BACK

## 2024-03-21 ASSESSMENT — PAIN DESCRIPTION - DESCRIPTORS
DESCRIPTORS: ACHING

## 2024-03-21 ASSESSMENT — PAIN SCALES - GENERAL
PAINLEVEL_OUTOF10: 3
PAINLEVEL_OUTOF10: 5
PAINLEVEL_OUTOF10: 5
PAINLEVEL_OUTOF10: 1
PAINLEVEL_OUTOF10: 5

## 2024-03-21 NOTE — PROGRESS NOTES
Fetal Monitoring Interpretation:   Baseline: 145  Variability: moderate in degree  Accelerations: Present  Decelerations: Absent                        [place]: Contractions are absent      Category 1     Reactive: Yes      Impression: Reassuring    Marla Acevedo DO

## 2024-03-21 NOTE — PROGRESS NOTES
Antepartum Progress Note    Subjective:  18 y.o.  at 29w3d (Estimated Date of Delivery: 6/3/24) who presented with right flank pain. She reports her pain is managed when she takes the pain medicaiton. She denies VB. Denies LOF. Reports + FM. Denies contractions. She reports some chills. She denies shortness of air or chest pain       Review of Systems:  Negative except right flank pain, chills     Objective:  /77   Pulse (!) 127   Temp 99.2 °F (37.3 °C) (Oral)   Resp 16   LMP 2023   SpO2 99%   General: Alert, well appearing, no acute distress  Lungs: Unlabored respirations   Abdomen: Gravid, soft, nontender to palpation, suprapubic tenderness, no rebound or guarding   Back: + CVA right   Extremities: No redness or tenderness    Cervix: not checked    Fetal Monitoring   FHT: 140 bpm  moderate zach, +accels, no decelerations  [place]: every irritability by external monitor     Assessment/Plan  18 y.o.  at 29w3d     Right flank pain - suspect pyelonephritis vs nephrolithiasis     - Right flank pain and CVA tenderness     - Urinalysis with yeast and positive leukocyte esterase with pyuria, small blood in urine     - Leukocytosis with WBC 16.8     - Renal US showing mild right sided hydronephrosis - bladder is empty with inability to identify ureteral jets     - Reviewed with UA findings and mild leukocytosis will treat with Rocephin for suspected Pyelonephritis     - Pain control with Oxycodone and Nubain if needed     - IV fluids     - Strain urine     - White count trending down      O pos      Yeast infection     - Order terazol      Hx of UTI in first trimester     - Klebsiella UTI in first trimester      Circumvallate placenta     Fetal wellbeing     - Reassuring     - NST q shift    Disposition:        Elidia Solis DO

## 2024-03-22 ENCOUNTER — APPOINTMENT (OUTPATIENT)
Dept: ULTRASOUND IMAGING | Age: 19
DRG: 566 | End: 2024-03-22
Payer: COMMERCIAL

## 2024-03-22 VITALS
OXYGEN SATURATION: 98 % | SYSTOLIC BLOOD PRESSURE: 119 MMHG | DIASTOLIC BLOOD PRESSURE: 57 MMHG | RESPIRATION RATE: 18 BRPM | TEMPERATURE: 97.9 F | HEART RATE: 102 BPM

## 2024-03-22 LAB
BASOPHILS # BLD: 0 K/UL (ref 0–0.2)
BASOPHILS NFR BLD: 0.3 %
DEPRECATED RDW RBC AUTO: 14.1 % (ref 12.4–15.4)
EOSINOPHIL # BLD: 0.1 K/UL (ref 0–0.6)
EOSINOPHIL NFR BLD: 0.6 %
HCT VFR BLD AUTO: 30.7 % (ref 36–48)
HGB BLD-MCNC: 10.2 G/DL (ref 12–16)
LYMPHOCYTES # BLD: 0.9 K/UL (ref 1–5.1)
LYMPHOCYTES NFR BLD: 7.5 %
MCH RBC QN AUTO: 29.8 PG (ref 26–34)
MCHC RBC AUTO-ENTMCNC: 33.1 G/DL (ref 31–36)
MCV RBC AUTO: 89.9 FL (ref 80–100)
MONOCYTES # BLD: 1.1 K/UL (ref 0–1.3)
MONOCYTES NFR BLD: 9.3 %
NEUTROPHILS # BLD: 9.6 K/UL (ref 1.7–7.7)
NEUTROPHILS NFR BLD: 82.3 %
PLATELET # BLD AUTO: 149 K/UL (ref 135–450)
PMV BLD AUTO: 9.3 FL (ref 5–10.5)
RBC # BLD AUTO: 3.41 M/UL (ref 4–5.2)
WBC # BLD AUTO: 11.7 K/UL (ref 4–11)

## 2024-03-22 PROCEDURE — 6360000002 HC RX W HCPCS: Performed by: OBSTETRICS & GYNECOLOGY

## 2024-03-22 PROCEDURE — 96366 THER/PROPH/DIAG IV INF ADDON: CPT

## 2024-03-22 PROCEDURE — 36415 COLL VENOUS BLD VENIPUNCTURE: CPT

## 2024-03-22 PROCEDURE — 96376 TX/PRO/DX INJ SAME DRUG ADON: CPT

## 2024-03-22 PROCEDURE — 85025 COMPLETE CBC W/AUTO DIFF WBC: CPT

## 2024-03-22 PROCEDURE — G0378 HOSPITAL OBSERVATION PER HR: HCPCS

## 2024-03-22 PROCEDURE — 99238 HOSP IP/OBS DSCHRG MGMT 30/<: CPT | Performed by: OBSTETRICS & GYNECOLOGY

## 2024-03-22 PROCEDURE — 76770 US EXAM ABDO BACK WALL COMP: CPT

## 2024-03-22 PROCEDURE — 96361 HYDRATE IV INFUSION ADD-ON: CPT

## 2024-03-22 PROCEDURE — 6370000000 HC RX 637 (ALT 250 FOR IP): Performed by: OBSTETRICS & GYNECOLOGY

## 2024-03-22 PROCEDURE — 2580000003 HC RX 258: Performed by: OBSTETRICS & GYNECOLOGY

## 2024-03-22 RX ORDER — ONDANSETRON 4 MG/1
4 TABLET, ORALLY DISINTEGRATING ORAL EVERY 8 HOURS PRN
Qty: 20 TABLET | Refills: 0 | Status: SHIPPED | OUTPATIENT
Start: 2024-03-22

## 2024-03-22 RX ORDER — OXYCODONE HYDROCHLORIDE 5 MG/1
5 TABLET ORAL EVERY 6 HOURS PRN
Qty: 15 TABLET | Refills: 0 | Status: SHIPPED | OUTPATIENT
Start: 2024-03-22 | End: 2024-03-27

## 2024-03-22 RX ORDER — NITROFURANTOIN 25; 75 MG/1; MG/1
100 CAPSULE ORAL 2 TIMES DAILY
Qty: 60 CAPSULE | Refills: 2 | Status: SHIPPED | OUTPATIENT
Start: 2024-03-22

## 2024-03-22 RX ADMIN — OXYCODONE 10 MG: 5 TABLET ORAL at 06:08

## 2024-03-22 RX ADMIN — OXYCODONE HYDROCHLORIDE 5 MG: 5 TABLET ORAL at 18:07

## 2024-03-22 RX ADMIN — SODIUM CHLORIDE, POTASSIUM CHLORIDE, SODIUM LACTATE AND CALCIUM CHLORIDE: 600; 310; 30; 20 INJECTION, SOLUTION INTRAVENOUS at 18:09

## 2024-03-22 RX ADMIN — ACETAMINOPHEN 1000 MG: 500 TABLET ORAL at 11:21

## 2024-03-22 RX ADMIN — SODIUM CHLORIDE, POTASSIUM CHLORIDE, SODIUM LACTATE AND CALCIUM CHLORIDE: 600; 310; 30; 20 INJECTION, SOLUTION INTRAVENOUS at 11:23

## 2024-03-22 RX ADMIN — CEFTRIAXONE SODIUM 1000 MG: 1 INJECTION, POWDER, FOR SOLUTION INTRAMUSCULAR; INTRAVENOUS at 18:10

## 2024-03-22 RX ADMIN — OXYCODONE HYDROCHLORIDE 5 MG: 5 TABLET ORAL at 11:22

## 2024-03-22 RX ADMIN — ACETAMINOPHEN 1000 MG: 500 TABLET ORAL at 19:07

## 2024-03-22 RX ADMIN — CEFTRIAXONE SODIUM 1000 MG: 1 INJECTION, POWDER, FOR SOLUTION INTRAMUSCULAR; INTRAVENOUS at 06:10

## 2024-03-22 RX ADMIN — ACETAMINOPHEN 1000 MG: 500 TABLET ORAL at 01:35

## 2024-03-22 RX ADMIN — SODIUM CHLORIDE, POTASSIUM CHLORIDE, SODIUM LACTATE AND CALCIUM CHLORIDE: 600; 310; 30; 20 INJECTION, SOLUTION INTRAVENOUS at 02:44

## 2024-03-22 ASSESSMENT — PAIN SCALES - GENERAL
PAINLEVEL_OUTOF10: 3
PAINLEVEL_OUTOF10: 4
PAINLEVEL_OUTOF10: 4
PAINLEVEL_OUTOF10: 2
PAINLEVEL_OUTOF10: 6

## 2024-03-22 ASSESSMENT — PAIN DESCRIPTION - LOCATION: LOCATION: BACK

## 2024-03-22 ASSESSMENT — PAIN DESCRIPTION - ORIENTATION: ORIENTATION: RIGHT

## 2024-03-22 NOTE — PROGRESS NOTES
PRN pain medication available now. Went to check on patient to see if she needs it. Pt sleeping/resting comfortably. Will check back.

## 2024-03-23 NOTE — PROGRESS NOTES
Pt verbalized understanding of verbal and written discharge instructions and denies having questions at this time. Pt left OB unit at 2303 ambulatory, undelivered, and in stable condition, accompanied by FOB.

## 2024-03-23 NOTE — DISCHARGE INSTR - DIET

## 2024-03-23 NOTE — PROGRESS NOTES
Antepartum Progress Note     Subjective:  19 y/o  at 29w4d (Estimated Date of Delivery: 6/3/24) presented on 3/20/24 with right flank pain. She reports her pain is managed when she takes the pain medicaiton. Pain has progressively improved over time.  She denies VB. Denies LOF. Reports + FM. Denies contractions. She reports some chills. She denies shortness of air or chest pain         Review of Systems:  Negative except right flank pain, chills      Objective:  Vitals:    24 1812   BP: (!) 119/57   Pulse: (!) 102   Resp:    Temp:    SpO2:      General: Alert, well appearing, no acute distress  Lungs: Unlabored respirations   Abdomen: Gravid, soft, nontender to palpation, suprapubic tenderness, no rebound or guarding   Back: + CVA right--improved since examination earlier today  Extremities: No redness or tenderness     Cervix: not checked     Fetal Monitoring   FHT: 120 bpm  moderate zach, +accels, no decelerations  [place]: no contractions     Assessment/Plan  18 y.o.  at 29w3d      Right flank pain - suspect pyelonephritis vs nephrolithiasis     - Right flank pain and CVA tenderness     - Urinalysis with yeast and positive leukocyte esterase with pyuria, small blood in urine     - Leukocytosis with WBC 16.8     - Renal US showing mild right sided hydronephrosis - bladder is empty with inability to identify ureteral jets; repeat renal ultrasound performed to re-evaluate due to persistent/prolonged pain--ultrasound unchanged.  Kidney measurements exhibit increase in size bilaterally--felt to be normal user variation     - Reviewed with UA findings and mild leukocytosis will treat with Rocephin for suspected Pyelonephritis     - Pain control with Oxycodone and Nubain if needed     - IV fluids     - Strain urine     - White count trending down      O pos      Yeast infection     - continue terazol      Hx of UTI in first trimester     - Klebsiella UTI in first trimester      Circumvallate placenta     Fetal  wellbeing     - Reassuring        Disposition:    Home this evening.   Continue to strain urine  Prophylactic macrobid.  Follow up prn and 1 week

## 2024-03-23 NOTE — DISCHARGE INSTRUCTIONS
Call for increased pain, significant vaginal bleeding or temperature greater than 101 degrees F.  Follow up 1 week.   Hydration with water.

## 2024-03-24 NOTE — DISCHARGE SUMMARY
DISCHARGE SUMMARY      Primary Diagnosis: pyelonephritis, flank pain, leukocytosis  Secondary Diagnosis: history of UTI (Klebsiella) in first trimester, yeast infection, circumvallate placenta  Procedures: renal ultrasound x 2, fetal monitoring  Referrals: none  Significant Findings: flank pain, leukocytosis  Reason for Hospitalization: flank pain  Complications:  none        Discharge Instructions:    Diet:common adult  Activity:activity as tolerated, no heavy lifting or driving for 2 weeks, pelvic rest x 6 weeks  Medications:  macrobid, zofran, oxycodone, terazol  Disposition: Home  Condition on discharge: Stable  Follow up: in 1 week(s)

## 2024-04-02 ENCOUNTER — ROUTINE PRENATAL (OUTPATIENT)
Dept: OBGYN CLINIC | Age: 19
End: 2024-04-02
Payer: COMMERCIAL

## 2024-04-02 VITALS
TEMPERATURE: 97.6 F | HEART RATE: 99 BPM | DIASTOLIC BLOOD PRESSURE: 60 MMHG | SYSTOLIC BLOOD PRESSURE: 100 MMHG | WEIGHT: 182.8 LBS | BODY MASS INDEX: 30.42 KG/M2

## 2024-04-02 DIAGNOSIS — Z34.93 PRENATAL CARE IN THIRD TRIMESTER: Primary | ICD-10-CM

## 2024-04-02 DIAGNOSIS — Z87.440 HISTORY OF URINARY TRACT INFECTION: ICD-10-CM

## 2024-04-02 DIAGNOSIS — O23.03 PYELONEPHRITIS AFFECTING PREGNANCY IN THIRD TRIMESTER: ICD-10-CM

## 2024-04-02 DIAGNOSIS — O43.113 CIRCUMVALLATE PLACENTA IN THIRD TRIMESTER: ICD-10-CM

## 2024-04-02 PROCEDURE — 99213 OFFICE O/P EST LOW 20 MIN: CPT | Performed by: OBSTETRICS & GYNECOLOGY

## 2024-04-02 NOTE — PROGRESS NOTES
appearing anatomy.  The uterus is unremarkable/gravid.  Maternal ovaries and adnexae are not well visualized due to the size of the uterus and patient's gravid state.     Anatomy seen includes: heart, stomach, kidneys, bladder     IMPRESSION:  Single live IUP in the third trimester. Adequate interval fetal growth; however the AC is measuring at 96.7%.  BPP 8/8.     Imaging is limited secondary to fetal position.  The patient is well aware of the limitations of ultrasound in the detection of anomalies    Assessment/Plan:   Diagnosis Orders   1. Prenatal care in third trimester        2. Circumvallate placenta in third trimester        3. Pyelonephritis affecting pregnancy in third trimester  Culture, Urine      4. History of urinary tract infection            Diagnosis Orders   1. Prenatal care in third trimester     Reassuring fetal / maternal status today  Aneuploidy / Carrier Screen: declined      PNL: O+, Hep B/C neg, HIV neg, Rub Nonimm, Iwlly, Nonimm, UDS + THC, Urine culture kleb pneumo  Anatomy: 1-3-2024, still incomplete on 2-7-2024, will plan to refer to M.   28 wk: GCT 72 Hgb 11.4 Plt 213  Tdap: at 28 wks    GBS: 35-37 wks   Birth Plan:    Breastfeeding Education:   Will have patient panel fingersticks secondary to AC greater than 90%ile    2. Circumvallate placenta in third trimester     Will repeat growth in 4 weeks.    3. Pyelonephritis affecting pregnancy in third trimester     - Patient on Macrobid    4. History of urinary tract infection     - Will send urine culture today      - reassuring maternal / fetal status     Follow Up  Return OB precautions reviewed   No follow-ups on file.    Approximately 10 minutes spent in room counseling patient on condition and coordination of care with over 50% in direct face to face counseling.     Elidia Solis DO

## 2024-04-03 ENCOUNTER — TELEPHONE (OUTPATIENT)
Dept: OBGYN CLINIC | Age: 19
End: 2024-04-03

## 2024-04-03 DIAGNOSIS — O26.843 UTERINE SIZE-DATE DISCREPANCY IN THIRD TRIMESTER: Primary | ICD-10-CM

## 2024-04-03 LAB — BACTERIA UR CULT: NORMAL

## 2024-04-03 NOTE — TELEPHONE ENCOUNTER
Patient is needing the blood sugar test kit. Kit and supplies are pending.     Routing to Dr. Solis.

## 2024-04-04 RX ORDER — GLUCOSAMINE HCL/CHONDROITIN SU 500-400 MG
CAPSULE ORAL
Qty: 100 STRIP | Refills: 5 | Status: SHIPPED | OUTPATIENT
Start: 2024-04-04

## 2024-04-04 RX ORDER — LANCETS 30 GAUGE
1 EACH MISCELLANEOUS
Qty: 100 EACH | Refills: 5 | Status: SHIPPED | OUTPATIENT
Start: 2024-04-04

## 2024-04-04 RX ORDER — BLOOD-GLUCOSE METER
1 KIT MISCELLANEOUS
Qty: 1 KIT | Refills: 0 | Status: SHIPPED | OUTPATIENT
Start: 2024-04-04

## 2024-04-17 ENCOUNTER — ROUTINE PRENATAL (OUTPATIENT)
Dept: OBGYN CLINIC | Age: 19
End: 2024-04-17
Payer: COMMERCIAL

## 2024-04-17 VITALS
DIASTOLIC BLOOD PRESSURE: 84 MMHG | SYSTOLIC BLOOD PRESSURE: 136 MMHG | BODY MASS INDEX: 31.62 KG/M2 | HEART RATE: 84 BPM | TEMPERATURE: 98.1 F | WEIGHT: 190 LBS

## 2024-04-17 DIAGNOSIS — Z34.03 ENCOUNTER FOR PRENATAL CARE IN THIRD TRIMESTER OF FIRST PREGNANCY: Primary | ICD-10-CM

## 2024-04-17 DIAGNOSIS — O23.03 PYELONEPHRITIS AFFECTING PREGNANCY IN THIRD TRIMESTER: ICD-10-CM

## 2024-04-17 DIAGNOSIS — O43.113 CIRCUMVALLATE PLACENTA IN THIRD TRIMESTER: ICD-10-CM

## 2024-04-17 PROCEDURE — 99213 OFFICE O/P EST LOW 20 MIN: CPT | Performed by: OBSTETRICS & GYNECOLOGY

## 2024-04-17 NOTE — PROGRESS NOTES
Rub Nonimm, Willy, Nonimm, UDS + THC, Urine culture kleb pneumo  Anatomy: 1-3-2024, still incomplete on 2-7-2024  Completed with MFM 2/23/24  28 wk: GCT 72 Hgb 11.4 Plt 213  Tdap: 3/6/2024  Labor, decreased FM, VB, LOF precautions reviewed  Return precautions reviewed  Will follow-up in 2 weeks for IVONNE visit     2. Circumvallate placenta in third trimester  4/2/2024: EFW 1930g (73.7 %ile) AC 96.7 %ile  Was checking BS for AC - Fasting 60s-70s, 1 hr 80s-90s  Okay to d/c BS  Growth scan at next visit     3. Pyelonephritis affecting pregnancy in third trimester  S/p Admission for pyelonephritis  Doing well on suppression Macrobid - denies need for refill    Marla Acevedo, DO

## 2024-04-25 ENCOUNTER — HOSPITAL ENCOUNTER (OUTPATIENT)
Age: 19
Discharge: HOME | End: 2024-04-26
Attending: OBSTETRICS & GYNECOLOGY | Admitting: OBSTETRICS & GYNECOLOGY
Payer: COMMERCIAL

## 2024-04-25 PROCEDURE — 81001 URINALYSIS AUTO W/SCOPE: CPT

## 2024-04-26 ENCOUNTER — APPOINTMENT (OUTPATIENT)
Dept: ULTRASOUND IMAGING | Age: 19
End: 2024-04-26
Payer: COMMERCIAL

## 2024-04-26 VITALS
HEART RATE: 85 BPM | TEMPERATURE: 98.2 F | SYSTOLIC BLOOD PRESSURE: 117 MMHG | HEIGHT: 65 IN | WEIGHT: 190 LBS | BODY MASS INDEX: 31.65 KG/M2 | OXYGEN SATURATION: 97 % | DIASTOLIC BLOOD PRESSURE: 59 MMHG | RESPIRATION RATE: 18 BRPM

## 2024-04-26 LAB
ALBUMIN SERPL-MCNC: 3.6 G/DL (ref 3.4–5)
ALBUMIN/GLOB SERPL: 1.3 {RATIO} (ref 1.1–2.2)
ALP SERPL-CCNC: 77 U/L (ref 40–129)
ALT SERPL-CCNC: 12 U/L (ref 10–40)
AMORPH SED URNS QL MICRO: NORMAL /HPF
ANION GAP SERPL CALCULATED.3IONS-SCNC: 12 MMOL/L (ref 3–16)
AST SERPL-CCNC: 18 U/L (ref 15–37)
BASOPHILS # BLD: 0.1 K/UL (ref 0–0.2)
BASOPHILS NFR BLD: 0.6 %
BILIRUB SERPL-MCNC: <0.2 MG/DL (ref 0–1)
BILIRUB UR QL STRIP.AUTO: NEGATIVE
BUN SERPL-MCNC: 14 MG/DL (ref 7–20)
CALCIUM SERPL-MCNC: 8.9 MG/DL (ref 8.3–10.6)
CHLORIDE SERPL-SCNC: 100 MMOL/L (ref 99–110)
CLARITY UR: ABNORMAL
CO2 SERPL-SCNC: 22 MMOL/L (ref 21–32)
COLOR UR: YELLOW
CREAT SERPL-MCNC: <0.5 MG/DL (ref 0.6–1.1)
DEPRECATED RDW RBC AUTO: 14.3 % (ref 12.4–15.4)
EOSINOPHIL # BLD: 0.1 K/UL (ref 0–0.6)
EOSINOPHIL NFR BLD: 0.6 %
EPI CELLS #/AREA URNS HPF: NORMAL /HPF (ref 0–5)
GFR SERPLBLD CREATININE-BSD FMLA CKD-EPI: >90 ML/MIN/{1.73_M2}
GLUCOSE SERPL-MCNC: 78 MG/DL (ref 70–99)
GLUCOSE UR STRIP.AUTO-MCNC: NEGATIVE MG/DL
HCT VFR BLD AUTO: 34.5 % (ref 36–48)
HGB BLD-MCNC: 11.3 G/DL (ref 12–16)
HGB UR QL STRIP.AUTO: ABNORMAL
KETONES UR STRIP.AUTO-MCNC: NEGATIVE MG/DL
LEUKOCYTE ESTERASE UR QL STRIP.AUTO: ABNORMAL
LYMPHOCYTES # BLD: 1.8 K/UL (ref 1–5.1)
LYMPHOCYTES NFR BLD: 13.8 %
MCH RBC QN AUTO: 28.2 PG (ref 26–34)
MCHC RBC AUTO-ENTMCNC: 32.9 G/DL (ref 31–36)
MCV RBC AUTO: 85.9 FL (ref 80–100)
MONOCYTES # BLD: 0.8 K/UL (ref 0–1.3)
MONOCYTES NFR BLD: 6.4 %
NEUTROPHILS # BLD: 10.1 K/UL (ref 1.7–7.7)
NEUTROPHILS NFR BLD: 78.6 %
NITRITE UR QL STRIP.AUTO: NEGATIVE
PH UR STRIP.AUTO: 7 [PH] (ref 5–8)
PLATELET # BLD AUTO: 184 K/UL (ref 135–450)
PMV BLD AUTO: 9.5 FL (ref 5–10.5)
POTASSIUM SERPL-SCNC: 3.6 MMOL/L (ref 3.5–5.1)
PROT SERPL-MCNC: 6.3 G/DL (ref 6.4–8.2)
PROT UR STRIP.AUTO-MCNC: NEGATIVE MG/DL
RBC # BLD AUTO: 4.01 M/UL (ref 4–5.2)
RBC #/AREA URNS HPF: NORMAL /HPF (ref 0–4)
SODIUM SERPL-SCNC: 134 MMOL/L (ref 136–145)
SP GR UR STRIP.AUTO: 1.02 (ref 1–1.03)
UA DIPSTICK W REFLEX MICRO PNL UR: YES
URN SPEC COLLECT METH UR: ABNORMAL
UROBILINOGEN UR STRIP-ACNC: 0.2 E.U./DL
WBC # BLD AUTO: 12.8 K/UL (ref 4–11)
WBC #/AREA URNS HPF: NORMAL /HPF (ref 0–5)

## 2024-04-26 PROCEDURE — 85025 COMPLETE CBC W/AUTO DIFF WBC: CPT

## 2024-04-26 PROCEDURE — 6370000000 HC RX 637 (ALT 250 FOR IP)

## 2024-04-26 PROCEDURE — 76770 US EXAM ABDO BACK WALL COMP: CPT

## 2024-04-26 PROCEDURE — 80053 COMPREHEN METABOLIC PANEL: CPT

## 2024-04-26 PROCEDURE — 2580000003 HC RX 258: Performed by: OBSTETRICS & GYNECOLOGY

## 2024-04-26 RX ORDER — ONDANSETRON 2 MG/ML
4 INJECTION INTRAMUSCULAR; INTRAVENOUS EVERY 6 HOURS PRN
Status: DISCONTINUED | OUTPATIENT
Start: 2024-04-26 | End: 2024-04-26 | Stop reason: HOSPADM

## 2024-04-26 RX ORDER — ACETAMINOPHEN 500 MG
TABLET ORAL
Status: COMPLETED
Start: 2024-04-26 | End: 2024-04-26

## 2024-04-26 RX ORDER — SODIUM CHLORIDE, SODIUM LACTATE, POTASSIUM CHLORIDE, AND CALCIUM CHLORIDE .6; .31; .03; .02 G/100ML; G/100ML; G/100ML; G/100ML
500 INJECTION, SOLUTION INTRAVENOUS ONCE
Status: COMPLETED | OUTPATIENT
Start: 2024-04-26 | End: 2024-04-26

## 2024-04-26 RX ORDER — ACETAMINOPHEN 500 MG
1000 TABLET ORAL ONCE
Status: COMPLETED | OUTPATIENT
Start: 2024-04-26 | End: 2024-04-26

## 2024-04-26 RX ORDER — SODIUM CHLORIDE, SODIUM LACTATE, POTASSIUM CHLORIDE, CALCIUM CHLORIDE 600; 310; 30; 20 MG/100ML; MG/100ML; MG/100ML; MG/100ML
INJECTION, SOLUTION INTRAVENOUS CONTINUOUS
Status: DISCONTINUED | OUTPATIENT
Start: 2024-04-26 | End: 2024-04-26

## 2024-04-26 RX ADMIN — Medication 1000 MG: at 00:51

## 2024-04-26 RX ADMIN — ACETAMINOPHEN 1000 MG: 500 TABLET ORAL at 00:51

## 2024-04-26 RX ADMIN — SODIUM CHLORIDE, SODIUM LACTATE, POTASSIUM CHLORIDE, AND CALCIUM CHLORIDE: .6; .31; .03; .02 INJECTION, SOLUTION INTRAVENOUS at 04:42

## 2024-04-26 RX ADMIN — SODIUM CHLORIDE, POTASSIUM CHLORIDE, SODIUM LACTATE AND CALCIUM CHLORIDE 500 ML: 600; 310; 30; 20 INJECTION, SOLUTION INTRAVENOUS at 01:00

## 2024-04-26 ASSESSMENT — PAIN SCALES - GENERAL: PAINLEVEL_OUTOF10: 5

## 2024-04-26 NOTE — PROGRESS NOTES
Dr. Carr returns to pt's room to discuss plan of care.  Notified pt that renal ultrasound will be done in the morning.

## 2024-04-26 NOTE — FLOWSHEET NOTE
Pt verbalized understanding of verbal and written discharge instructions and denies having questions at this time. Pt left OB unit at 1049 ambulatory, undelivered, and in stable condition, accompanied by family. Patient is not in active labor.

## 2024-04-26 NOTE — PROGRESS NOTES
Dr. Carr notified of pt's arrival to triage and c/o.  Order received for UA.  MD will come see pt.

## 2024-04-26 NOTE — PROGRESS NOTES
Followed-up with patient following renal US.  Reviewed renal US within normal limits.  Patient reports her pain is improved, currently 3/10 and just dull aching.  +FM.    Reviewed likely nephrolithiasis and physiologic hydronephrosis on US.  Continue Macrobid suppression.     Stable for discharge home.  FHR category 1, reactive.      Follow-up as scheduled in 1 week  Labor, decreased FM, VB, LOF precautions reviewed  Pain precautions reviewed  Return precautions reviewed     Marla Acevedo DO

## 2024-04-26 NOTE — PROGRESS NOTES
Pt presents to triage with c/o left sided flank pain that woke her up around 2300 tonight.  States pain is a stabbing, burning pain that she currently rates at a 4 on the pain scale.  States it was up to a 7 on pain scale when it started.  States she felt some pain in bladder area on way to the hospital.  States she has thrown up around 3 times since pain started.  Denies any cramping or pain in abdomen.  Denies any vaginal discharge, bleeding or leakage of fluid.  States infant is moving well.  States she was in the hospital about a month ago for similar pain but it was on the right side then.

## 2024-04-26 NOTE — H&P
Obstetrics Triage History and Physical    CC:   Chief Complaint   Patient presents with    Flank Pain     Left sided flank pain       HPI: Merissa Whipple is a 18 y.o.  at 34w4d who presents with complaints of flank pain. States she woke up in the middle of the night with left sided pain. Came in to the hospital right away, does report 3 episodes of vomiting since then as well. Reports the pain is stabbing and burning, 4/10. No fevers/chills, no dysuria/hematuria. Also having some lower pelvic pain as well. No vaginal bleeding, leakage of fluid. +FM. Denies contractions.    Patient was admitted to hospital last month for possible pyelonephritis vs nephrolithiasis on the right, has been on macrobid suppression since that time.     Review of Systems: The following ROS was otherwise negative, except as noted in the HPI: constitutional, HEENT, respiratory, cardiovascular, gastrointestinal, genitourinary, skin, musculoskeletal, neurological, psych.     OBGYN Provider : Will    Obstetrical History:  OB History    Para Term  AB Living   1 0 0 0 0 0   SAB IAB Ectopic Molar Multiple Live Births   0 0 0 0 0 0      # Outcome Date GA Lbr David/2nd Weight Sex Delivery Anes PTL Lv   1 Current                Past Medical History:   Past Medical History:   Diagnosis Date    Chronic kidney disease     history of UTI's, pyelonephritis    Circumvallate placenta     Depression     Major Depressive Disorder    PTSD (post-traumatic stress disorder)        Medications:  No current facility-administered medications on file prior to encounter.     Current Outpatient Medications on File Prior to Encounter   Medication Sig Dispense Refill    Prenatal Vit-Fe Fumarate-FA (PRENATAL VITAMIN PO) Take by mouth      nitrofurantoin, macrocrystal-monohydrate, (MACROBID) 100 MG capsule Take 1 capsule by mouth 2 times daily 60 capsule 2       Allergies:  Patient has no known allergies.    Surgical History:  History

## 2024-04-26 NOTE — CARE COORDINATION
Patient triggered re at risk for interpersonal safety. Writer spoke with JOSH Schafer reports Patient with hx, denies any current concern. Please place consult if need further eval, thank you.VERNELL Moraes

## 2024-04-26 NOTE — PROGRESS NOTES
Dr. Carr at pt's bedside to examine pt, review FHR tracing and UA results.  Orders received for po Tylenol, labs and IV fluids.  Renal ultrasound ordered.

## 2024-05-01 ENCOUNTER — ROUTINE PRENATAL (OUTPATIENT)
Dept: OBGYN CLINIC | Age: 19
End: 2024-05-01

## 2024-05-01 VITALS
SYSTOLIC BLOOD PRESSURE: 119 MMHG | DIASTOLIC BLOOD PRESSURE: 80 MMHG | HEART RATE: 115 BPM | BODY MASS INDEX: 32.12 KG/M2 | WEIGHT: 193 LBS

## 2024-05-01 DIAGNOSIS — Z34.03 ENCOUNTER FOR PRENATAL CARE IN THIRD TRIMESTER OF FIRST PREGNANCY: Primary | ICD-10-CM

## 2024-05-01 DIAGNOSIS — O23.03 PYELONEPHRITIS AFFECTING PREGNANCY IN THIRD TRIMESTER: ICD-10-CM

## 2024-05-01 DIAGNOSIS — O43.113 CIRCUMVALLATE PLACENTA IN THIRD TRIMESTER: ICD-10-CM

## 2024-05-01 NOTE — PROGRESS NOTES
18 y.o.  at 35w2d EGA Estimated Date of Delivery: 6/3/24 here for IVONNE:     Pt seen and examined. No concerns/complaints.  Denies VB, LOF, CTX. Endorses (+) FM. Denies fevers / chills / chest pain / shortness of breath.   Denies HA, changes with vision, RUQ pain, edema.     Objective:  /80   Pulse (!) 115   Wt 87.5 kg (193 lb)   LMP 2023   BMI 32.12 kg/m²   Gen: AO, NAD  Abd: Soft, NT, gravid   Ext: Mild LE edema    OBSTETRICAL ULTRASOUND GROWTH     DATE: 2024     PHYSICIAN: LEATHA Solis D.O.      SONOGRAPHER: ILYA Shafer RDMS     INDICATION: Growth,      TYPE OF SCAN: abdominal     FINDINGS:  A single viable intrauterine pregnancy is noted in cephalic presentation. Cardiac and somatic activity are noted.     The following values were obtained:              Fetal heart rate                                               145bpm              BPD                                                                 8.88cm                        71.8 %              Head Circumference                                       32.80cm                      66.6 %               Abdominal Circumference                              33.12cm                      93.6 %              Femur Length                                                  6.78cm                        31.6 %              Amniotic fluid index                                         16.29cm              EFW                                                                2925g              77.6 percentile     Amniotic fluid volume is normal. Based on sonographic criteria the estimated fetal age is 36weeks and 2days with EDC of 2024. There is a 7 day discordance with the established EDC of 2024.      The patient has an anterior placenta that is adequate distance in relation to the internal cervical os. The evaluation of the lower uterine segment and cervix reveals normal appearing anatomy.  The uterus is unremarkable/gravid.

## 2024-05-01 NOTE — PROGRESS NOTES
18 y.o.  at 35w2d EGA Estimated Date of Delivery: 6/3/24 here for IVONNE:     Pt seen and examined. No concerns/complaints.  ***Denies VB, LOF, CTX. Endorses (+) FM. Denies fevers / chills / chest pain / shortness of breath.   Denies HA, changes with vision, RUQ pain, edema.   MWQ reviewed.     Objective:  /80   Pulse (!) 115   Wt 87.5 kg (193 lb)   LMP 2023   BMI 32.12 kg/m²   Gen: AO, NAD  Abd: Soft, NT, gravid   Ext: Mild LE edema    Assessment/Plan:   Diagnosis Orders   1. Encounter for prenatal care in third trimester of first pregnancy        2. Circumvallate placenta in third trimester        3. Pyelonephritis affecting pregnancy in third trimester             - reassuring maternal / fetal status     Follow Up  Return OB precautions reviewed   No follow-ups on file.    Approximately *** minutes spent in room counseling patient on condition and coordination of care with over 50% in direct face to face counseling.     Elidia Solis, DO

## 2024-05-08 ENCOUNTER — ROUTINE PRENATAL (OUTPATIENT)
Dept: OBGYN CLINIC | Age: 19
End: 2024-05-08
Payer: COMMERCIAL

## 2024-05-08 VITALS
DIASTOLIC BLOOD PRESSURE: 79 MMHG | SYSTOLIC BLOOD PRESSURE: 123 MMHG | TEMPERATURE: 97.6 F | HEART RATE: 108 BPM | BODY MASS INDEX: 32.12 KG/M2 | WEIGHT: 193 LBS

## 2024-05-08 DIAGNOSIS — O43.113 CIRCUMVALLATE PLACENTA IN THIRD TRIMESTER: ICD-10-CM

## 2024-05-08 DIAGNOSIS — Z87.440 HISTORY OF URINARY TRACT INFECTION: ICD-10-CM

## 2024-05-08 DIAGNOSIS — O23.03 PYELONEPHRITIS AFFECTING PREGNANCY IN THIRD TRIMESTER: ICD-10-CM

## 2024-05-08 DIAGNOSIS — Z34.03 ENCOUNTER FOR PRENATAL CARE IN THIRD TRIMESTER OF FIRST PREGNANCY: Primary | ICD-10-CM

## 2024-05-08 PROCEDURE — 99213 OFFICE O/P EST LOW 20 MIN: CPT | Performed by: OBSTETRICS & GYNECOLOGY

## 2024-05-08 NOTE — PROGRESS NOTES
18 y.o.  at 36w2d EGA Estimated Date of Delivery: 6/3/24 here for IVONNE:     Pt seen and examined. No concerns/complaints.  Denies VB, LOF, CTX. Endorses (+) FM. Denies fevers / chills / chest pain / shortness of breath.   Denies HA, changes with vision, RUQ pain, edema.   MWQ reviewed.     Objective:  /79   Pulse (!) 108   Temp 97.6 °F (36.4 °C) (Infrared)   Wt 87.5 kg (193 lb)   LMP 2023   BMI 32.12 kg/m²   Gen: AO, NAD  Abd: Soft, NT, gravid   Ext: Mild LE edema    Assessment/Plan:   Diagnosis Orders   1. Encounter for prenatal care in third trimester of first pregnancy  Culture, Strep B Screen, Vaginal/Rectal      2. Pyelonephritis affecting pregnancy in third trimester        3. Circumvallate placenta in third trimester        4. History of urinary tract infection            Diagnosis Orders   1. Encounter for prenatal care in third trimester of first pregnancy  Culture, Strep B Screen, Vaginal/Rectal   Reassuring fetal / maternal status today  Aneuploidy / Carrier Screen: declined      PNL: O+, Hep B/C neg, HIV neg, Rub Nonimm, Willy, Nonimm, UDS + THC, Urine culture kleb pneumo  Anatomy: 1-3-2024, still incomplete on 2024, will plan to refer to M.   28 wk: GCT 72 Hgb 11.4 Plt 213  Tdap: 3/6/24  GBS: done today   Birth Plan:  desires epidural   Breastfeeding Education:   Fingersticks done secondary to AC size, all normal   Boy- Chap[place], desires circumcision   2. Pyelonephritis affecting pregnancy in third trimester     - Continue suppressive therapy with Macrobid will switch to Keflex next visit   3. Circumvallate placenta in third trimester        4. History of urinary tract infection          - reassuring maternal / fetal status     Follow Up  Return OB precautions reviewed   No follow-ups on file.    Approximately 10 minutes spent in room counseling patient on condition and coordination of care with over 50% in direct face to face counseling.     Elidia Solis, DO

## 2024-05-10 LAB
GP B STREP SPEC QL CULT: ABNORMAL
ORGANISM: ABNORMAL

## 2024-05-14 ENCOUNTER — ROUTINE PRENATAL (OUTPATIENT)
Dept: OBGYN CLINIC | Age: 19
End: 2024-05-14
Payer: COMMERCIAL

## 2024-05-14 VITALS
HEART RATE: 110 BPM | WEIGHT: 198 LBS | DIASTOLIC BLOOD PRESSURE: 73 MMHG | BODY MASS INDEX: 32.95 KG/M2 | TEMPERATURE: 98 F | SYSTOLIC BLOOD PRESSURE: 115 MMHG

## 2024-05-14 DIAGNOSIS — O23.03 PYELONEPHRITIS AFFECTING PREGNANCY IN THIRD TRIMESTER: ICD-10-CM

## 2024-05-14 DIAGNOSIS — O43.113 CIRCUMVALLATE PLACENTA IN THIRD TRIMESTER: ICD-10-CM

## 2024-05-14 DIAGNOSIS — Z87.440 HISTORY OF URINARY TRACT INFECTION: ICD-10-CM

## 2024-05-14 DIAGNOSIS — A49.1 GBS (GROUP B STREPTOCOCCUS) INFECTION: ICD-10-CM

## 2024-05-14 DIAGNOSIS — Z34.03 ENCOUNTER FOR PRENATAL CARE IN THIRD TRIMESTER OF FIRST PREGNANCY: Primary | ICD-10-CM

## 2024-05-14 PROCEDURE — 99213 OFFICE O/P EST LOW 20 MIN: CPT | Performed by: OBSTETRICS & GYNECOLOGY

## 2024-05-14 RX ORDER — CEPHALEXIN 250 MG/1
250 CAPSULE ORAL DAILY
Qty: 30 CAPSULE | Refills: 3 | Status: SHIPPED | OUTPATIENT
Start: 2024-05-14

## 2024-05-14 NOTE — PROGRESS NOTES
18 y.o.  at 37w1d EGA Estimated Date of Delivery: 6/3/24 here for IVONNE:     Pt seen and examined. No concerns/complaints.  Denies VB, LOF, CTX. Endorses (+) FM. Denies fevers / chills / chest pain / shortness of breath.   Denies HA, changes with vision, RUQ pain, edema.   MWQ reviewed.     Objective:  /73   Pulse (!) 110   Temp 98 °F (36.7 °C) (Infrared)   Wt 89.8 kg (198 lb)   LMP 2023   BMI 32.95 kg/m²   Gen: AO, NAD  Abd: Soft, NT, gravid   Ext: Mild LE edema    Assessment/Plan:   Diagnosis Orders   1. Encounter for prenatal care in third trimester of first pregnancy        2. Pyelonephritis affecting pregnancy in third trimester        3. Circumvallate placenta in third trimester        4. GBS (group B streptococcus) infection        5. History of urinary tract infection            Diagnosis Orders   1. Encounter for prenatal care in third trimester of first pregnancy     Reassuring fetal / maternal status today  Aneuploidy / Carrier Screen: declined      PNL: O+, Hep B/C neg, HIV neg, Rub Nonimm, Willy, Nonimm, UDS + THC, Urine culture kleb pneumo  Anatomy: 1-3-2024, still incomplete on 2024, will plan to refer to [facility].   28 wk: GCT 72 Hgb 11.4 Plt 213  Tdap: 3/6/24  GBS: +  Birth Plan:  desires epidural   Breastfeeding Education:   Fingersticks done secondary to AC size, all normal   Boy- [place], desires circumcision   2. Pyelonephritis affecting pregnancy in third trimester     - Switched patient to Keflex    3. Circumvallate placenta in third trimester        4. GBS (group B streptococcus) infection        5. History of urinary tract infection          - reassuring maternal / fetal status     Follow Up  Return OB precautions reviewed   No follow-ups on file.    Approximately 10 minutes spent in room counseling patient on condition and coordination of care with over 50% in direct face to face counseling.     Elidia Solis DO

## 2024-05-21 ENCOUNTER — ROUTINE PRENATAL (OUTPATIENT)
Dept: OBGYN CLINIC | Age: 19
End: 2024-05-21

## 2024-05-21 VITALS
DIASTOLIC BLOOD PRESSURE: 70 MMHG | BODY MASS INDEX: 32.45 KG/M2 | TEMPERATURE: 97.6 F | WEIGHT: 195 LBS | HEART RATE: 103 BPM | SYSTOLIC BLOOD PRESSURE: 124 MMHG

## 2024-05-21 DIAGNOSIS — A49.1 GBS (GROUP B STREPTOCOCCUS) INFECTION: ICD-10-CM

## 2024-05-21 DIAGNOSIS — Z34.03 ENCOUNTER FOR PRENATAL CARE IN THIRD TRIMESTER OF FIRST PREGNANCY: Primary | ICD-10-CM

## 2024-05-21 DIAGNOSIS — O26.843 UTERINE SIZE-DATE DISCREPANCY IN THIRD TRIMESTER: ICD-10-CM

## 2024-05-21 DIAGNOSIS — Z87.440 HISTORY OF URINARY TRACT INFECTION: ICD-10-CM

## 2024-05-21 DIAGNOSIS — O23.03 PYELONEPHRITIS AFFECTING PREGNANCY IN THIRD TRIMESTER: ICD-10-CM

## 2024-05-21 DIAGNOSIS — O43.113 CIRCUMVALLATE PLACENTA IN THIRD TRIMESTER: ICD-10-CM

## 2024-05-21 NOTE — PROGRESS NOTES
18 y.o.  at 38w1d EGA Estimated Date of Delivery: 6/3/24 here for IVONNE:     Pt seen and examined. No concerns/complaints.  Denies VB, LOF, CTX. Endorses (+) FM.   Denies fevers / chills / chest pain / shortness of breath.   Denies HA, changes with vision, RUQ pain, edema.   MWQ reviewed.     Objective:  /70 (Site: Right Upper Arm, Position: Sitting, Cuff Size: Medium Adult)   Pulse (!) 103   Temp 97.6 °F (36.4 °C) (Infrared)   Wt 88.5 kg (195 lb)   LMP 2023   BMI 32.45 kg/m²   Gen: AO, NAD  Abd: Soft, NT, gravid    VE: 1-2/60/-3    bpm    Ext: Mild LE edema  OMM: Increased lumbar lordosis    Assessment/Plan:   Diagnosis Orders   1. Encounter for prenatal care in third trimester of first pregnancy        2. Pyelonephritis affecting pregnancy in third trimester        3. Circumvallate placenta in third trimester        4. GBS (group B streptococcus) infection        5. History of urinary tract infection        6. Uterine size-date discrepancy in third trimester           - reassuring maternal / fetal status   - VE same as last visit   - GBS (+), reviewed LD plan   - discussed delivery planning, pt open to IOL btwen 41-42 wks if spont labor has not occurred     Follow Up  Return OB precautions reviewed   Return in about 1 week (around 2024) for Return OB visit.    Ranjana Smith DO

## 2024-05-27 ENCOUNTER — TELEPHONE (OUTPATIENT)
Dept: OBGYN | Age: 19
End: 2024-05-27

## 2024-05-27 NOTE — TELEPHONE ENCOUNTER
19 y/o  female at 39 weeks 0 days gestation with EDC 6/3/24   Telephone call.  Calling with concerns of decreased fetal movement.  States that upon calling answering service has noted some fetal movement.    Prior to call, last felt significant movement at 10 am.   Denies vaginal bleeding, loss of fluid, contractions, pelvic pain.   Denies headache, vision changes, and RUQ pain.   Denies fever, chills, chest pain, shortness of breath, nausea, vomiting, diarrhea, constipation, dysuria and hematuria.   Advised patient to report to L&D for evaluation.  Advised patient ok to continue to monitor fetal movement but very low threshold for evaluation in triage.    Sending to pravin and Dr. Solis as FYI.   Please check on patient on 24 if patient does not report to triage.   Thanks

## 2024-05-28 ENCOUNTER — ROUTINE PRENATAL (OUTPATIENT)
Dept: OBGYN CLINIC | Age: 19
End: 2024-05-28
Payer: COMMERCIAL

## 2024-05-28 VITALS
HEART RATE: 68 BPM | SYSTOLIC BLOOD PRESSURE: 118 MMHG | TEMPERATURE: 97.8 F | BODY MASS INDEX: 33.32 KG/M2 | WEIGHT: 200.2 LBS | DIASTOLIC BLOOD PRESSURE: 75 MMHG

## 2024-05-28 DIAGNOSIS — Z34.03 ENCOUNTER FOR PRENATAL CARE IN THIRD TRIMESTER OF FIRST PREGNANCY: Primary | ICD-10-CM

## 2024-05-28 DIAGNOSIS — A49.1 GBS (GROUP B STREPTOCOCCUS) INFECTION: ICD-10-CM

## 2024-05-28 DIAGNOSIS — O26.843 UTERINE SIZE-DATE DISCREPANCY IN THIRD TRIMESTER: ICD-10-CM

## 2024-05-28 DIAGNOSIS — O23.03 PYELONEPHRITIS AFFECTING PREGNANCY IN THIRD TRIMESTER: ICD-10-CM

## 2024-05-28 DIAGNOSIS — O43.113 CIRCUMVALLATE PLACENTA IN THIRD TRIMESTER: ICD-10-CM

## 2024-05-28 DIAGNOSIS — Z87.440 HISTORY OF URINARY TRACT INFECTION: ICD-10-CM

## 2024-05-28 PROCEDURE — 99213 OFFICE O/P EST LOW 20 MIN: CPT | Performed by: OBSTETRICS & GYNECOLOGY

## 2024-05-28 NOTE — PROGRESS NOTES
18 y.o.  at 39w1d EGA Estimated Date of Delivery: 6/3/24 here for IVONNE:     Pt seen and examined. No concerns/complaints.  Denies VB, LOF, CTX. Endorses (+) FM. Denies fevers / chills / chest pain / shortness of breath.   Denies HA, changes with vision, RUQ pain, edema.   MWQ reviewed.     Objective:  /75   Pulse 68   Temp 97.8 °F (36.6 °C) (Infrared)   Wt 90.8 kg (200 lb 3.2 oz)   LMP 2023   BMI 33.32 kg/m²   Gen: AO, NAD  Abd: Soft, NT, gravid   Ext: Mild LE edema    Assessment/Plan:   Diagnosis Orders   1. Encounter for prenatal care in third trimester of first pregnancy        2. Pyelonephritis affecting pregnancy in third trimester        3. Circumvallate placenta in third trimester        4. GBS (group B streptococcus) infection        5. History of urinary tract infection        6. Uterine size-date discrepancy in third trimester            Diagnosis Orders   1. Encounter for prenatal care in third trimester of first pregnancy     GBS +   RTC in 1 week   Labor precautions    2. Pyelonephritis affecting pregnancy in third trimester        3. Circumvallate placenta in third trimester     - Discussed possible IOL. Patient not currently interested.   - Will check growth at next visit.    4. GBS (group B streptococcus) infection     Will need antibiotics in labor    5. History of urinary tract infection     - on keflex    6. Uterine size-date discrepancy in third trimester          - reassuring maternal / fetal status     Follow Up  Return OB precautions reviewed   No follow-ups on file.    Approximately 10 minutes spent in room counseling patient on condition and coordination of care with over 50% in direct face to face counseling.     Elidia Solis, DO

## 2024-06-05 ENCOUNTER — ROUTINE PRENATAL (OUTPATIENT)
Dept: OBGYN CLINIC | Age: 19
End: 2024-06-05
Payer: COMMERCIAL

## 2024-06-05 VITALS
WEIGHT: 202 LBS | DIASTOLIC BLOOD PRESSURE: 80 MMHG | HEART RATE: 117 BPM | BODY MASS INDEX: 33.61 KG/M2 | SYSTOLIC BLOOD PRESSURE: 120 MMHG | TEMPERATURE: 97.6 F

## 2024-06-05 DIAGNOSIS — Z34.03 ENCOUNTER FOR PRENATAL CARE IN THIRD TRIMESTER OF FIRST PREGNANCY: Primary | ICD-10-CM

## 2024-06-05 DIAGNOSIS — Z87.440 HISTORY OF URINARY TRACT INFECTION: ICD-10-CM

## 2024-06-05 DIAGNOSIS — O23.03 PYELONEPHRITIS AFFECTING PREGNANCY IN THIRD TRIMESTER: ICD-10-CM

## 2024-06-05 DIAGNOSIS — O43.113 CIRCUMVALLATE PLACENTA IN THIRD TRIMESTER: ICD-10-CM

## 2024-06-05 DIAGNOSIS — A49.1 GBS (GROUP B STREPTOCOCCUS) INFECTION: ICD-10-CM

## 2024-06-05 DIAGNOSIS — O26.843 UTERINE SIZE-DATE DISCREPANCY IN THIRD TRIMESTER: ICD-10-CM

## 2024-06-05 PROCEDURE — 99213 OFFICE O/P EST LOW 20 MIN: CPT | Performed by: OBSTETRICS & GYNECOLOGY

## 2024-06-05 NOTE — PROGRESS NOTES
18 y.o.  at 40w2d EGA Estimated Date of Delivery: 6/3/24 here for IVONNE:     Pt seen and examined. No concerns/complaints.  Denies VB, LOF, CTX. Endorses (+) FM. Denies fevers / chills / chest pain / shortness of breath.   Denies HA, changes with vision, RUQ pain, edema.   MWQ reviewed.     Objective:  /80   Pulse (!) 117   Temp 97.6 °F (36.4 °C) (Infrared)   Wt 91.6 kg (202 lb)   LMP 2023   BMI 33.61 kg/m²   Gen: AO, NAD  Abd: Soft, NT, gravid   Ext: Mild LE edema    OB ULTRASOUND:  BIOPHYSICAL PROFILE     DATE: 2024     PHYSICIAN: LEATHA Solis D.O.     SONOGRAPHER: ILYA Shafer [facility]     INDICATION: Fetal well being     TYPE OF SCAN: abdominal     BPP: 8/8  Tone: 2, Gross fetal movement: 2, Breathin, Fluid: 2     FINDINGS:  A single viable intrauterine pregnancy is noted in cephalic presentation. Cardiac and somatic activity are noted.      The following values were obtained:              Fetal heart rate 138bpm              Amniotic fluid index 15.66cm     IMPRESSION:   Single live IUP in the third trimester. BPP 8/8. GRACE 15.66cm.     Imaging is limited secondary to fetal position.  The patient is well aware of the limitations of ultrasound in the detection of anomalies.       Assessment/Plan:   Diagnosis Orders   1. Encounter for prenatal care in third trimester of first pregnancy        2. Pyelonephritis affecting pregnancy in third trimester        3. Circumvallate placenta in third trimester        4. GBS (group B streptococcus) infection        5. History of urinary tract infection        6. Uterine size-date discrepancy in third trimester            Diagnosis Orders   1. Encounter for prenatal care in third trimester of first pregnancy     - Scheduled IOL for tomorrow am   - Discussed with Federer   - Plan for Pitocin and Pen G    2. Pyelonephritis affecting pregnancy in third trimester        3. Circumvallate placenta in third trimester        4. GBS (group B streptococcus)

## 2024-06-07 ENCOUNTER — HOSPITAL ENCOUNTER (INPATIENT)
Age: 19
LOS: 4 days | Discharge: HOME | DRG: 540 | End: 2024-06-11
Attending: OBSTETRICS & GYNECOLOGY | Admitting: OBSTETRICS & GYNECOLOGY
Payer: COMMERCIAL

## 2024-06-07 ENCOUNTER — APPOINTMENT (OUTPATIENT)
Dept: LABOR AND DELIVERY | Age: 19
DRG: 540 | End: 2024-06-07
Payer: COMMERCIAL

## 2024-06-07 DIAGNOSIS — Z98.891 S/P PRIMARY LOW TRANSVERSE C-SECTION: Primary | ICD-10-CM

## 2024-06-07 PROBLEM — Z34.90 ENCOUNTER FOR ELECTIVE INDUCTION OF LABOR: Status: ACTIVE | Noted: 2024-06-07

## 2024-06-07 LAB
ABO + RH BLD: NORMAL
AMPHETAMINES UR QL SCN>1000 NG/ML: NORMAL
BARBITURATES UR QL SCN>200 NG/ML: NORMAL
BASOPHILS # BLD: 0.1 K/UL (ref 0–0.2)
BASOPHILS NFR BLD: 0.4 %
BENZODIAZ UR QL SCN>200 NG/ML: NORMAL
BLD GP AB SCN SERPL QL: NORMAL
BUPRENORPHINE+NOR UR QL SCN: NORMAL
CANNABINOIDS UR QL SCN>50 NG/ML: NORMAL
COCAINE UR QL SCN: NORMAL
DEPRECATED RDW RBC AUTO: 15.6 % (ref 12.4–15.4)
DRUG SCREEN COMMENT UR-IMP: NORMAL
EOSINOPHIL # BLD: 0.1 K/UL (ref 0–0.6)
EOSINOPHIL NFR BLD: 0.7 %
FENTANYL SCREEN, URINE: NORMAL
HCT VFR BLD AUTO: 34.3 % (ref 36–48)
HGB BLD-MCNC: 11.4 G/DL (ref 12–16)
LYMPHOCYTES # BLD: 2.3 K/UL (ref 1–5.1)
LYMPHOCYTES NFR BLD: 15.5 %
MCH RBC QN AUTO: 28.1 PG (ref 26–34)
MCHC RBC AUTO-ENTMCNC: 33.1 G/DL (ref 31–36)
MCV RBC AUTO: 84.9 FL (ref 80–100)
METHADONE UR QL SCN>300 NG/ML: NORMAL
MONOCYTES # BLD: 0.9 K/UL (ref 0–1.3)
MONOCYTES NFR BLD: 6 %
NEUTROPHILS # BLD: 11.5 K/UL (ref 1.7–7.7)
NEUTROPHILS NFR BLD: 77.4 %
OPIATES UR QL SCN>300 NG/ML: NORMAL
OXYCODONE UR QL SCN: NORMAL
PCP UR QL SCN>25 NG/ML: NORMAL
PH UR STRIP: 6 [PH]
PLATELET # BLD AUTO: 186 K/UL (ref 135–450)
PMV BLD AUTO: 10.4 FL (ref 5–10.5)
RBC # BLD AUTO: 4.04 M/UL (ref 4–5.2)
WBC # BLD AUTO: 14.8 K/UL (ref 4–11)

## 2024-06-07 PROCEDURE — 86780 TREPONEMA PALLIDUM: CPT

## 2024-06-07 PROCEDURE — 1220000000 HC SEMI PRIVATE OB R&B

## 2024-06-07 PROCEDURE — 86923 COMPATIBILITY TEST ELECTRIC: CPT

## 2024-06-07 PROCEDURE — 85025 COMPLETE CBC W/AUTO DIFF WBC: CPT

## 2024-06-07 PROCEDURE — 6360000002 HC RX W HCPCS: Performed by: OBSTETRICS & GYNECOLOGY

## 2024-06-07 PROCEDURE — 86901 BLOOD TYPING SEROLOGIC RH(D): CPT

## 2024-06-07 PROCEDURE — 86850 RBC ANTIBODY SCREEN: CPT

## 2024-06-07 PROCEDURE — 80307 DRUG TEST PRSMV CHEM ANLYZR: CPT

## 2024-06-07 PROCEDURE — 2580000003 HC RX 258: Performed by: OBSTETRICS & GYNECOLOGY

## 2024-06-07 PROCEDURE — 86900 BLOOD TYPING SEROLOGIC ABO: CPT

## 2024-06-07 RX ORDER — SODIUM CHLORIDE, SODIUM LACTATE, POTASSIUM CHLORIDE, CALCIUM CHLORIDE 600; 310; 30; 20 MG/100ML; MG/100ML; MG/100ML; MG/100ML
INJECTION, SOLUTION INTRAVENOUS CONTINUOUS
Status: DISCONTINUED | OUTPATIENT
Start: 2024-06-07 | End: 2024-06-08

## 2024-06-07 RX ORDER — MISOPROSTOL 100 UG/1
400 TABLET ORAL PRN
Status: DISCONTINUED | OUTPATIENT
Start: 2024-06-07 | End: 2024-06-08

## 2024-06-07 RX ORDER — METHYLERGONOVINE MALEATE 0.2 MG/ML
200 INJECTION INTRAVENOUS PRN
Status: DISCONTINUED | OUTPATIENT
Start: 2024-06-07 | End: 2024-06-08

## 2024-06-07 RX ORDER — TRANEXAMIC ACID 10 MG/ML
1000 INJECTION, SOLUTION INTRAVENOUS
Status: DISCONTINUED | OUTPATIENT
Start: 2024-06-07 | End: 2024-06-08

## 2024-06-07 RX ORDER — CARBOPROST TROMETHAMINE 250 UG/ML
250 INJECTION, SOLUTION INTRAMUSCULAR PRN
Status: DISCONTINUED | OUTPATIENT
Start: 2024-06-07 | End: 2024-06-08

## 2024-06-07 RX ORDER — ONDANSETRON 4 MG/1
4 TABLET, ORALLY DISINTEGRATING ORAL EVERY 6 HOURS PRN
Status: DISCONTINUED | OUTPATIENT
Start: 2024-06-07 | End: 2024-06-08

## 2024-06-07 RX ORDER — ONDANSETRON 2 MG/ML
4 INJECTION INTRAMUSCULAR; INTRAVENOUS EVERY 6 HOURS PRN
Status: DISCONTINUED | OUTPATIENT
Start: 2024-06-07 | End: 2024-06-08

## 2024-06-07 RX ORDER — SODIUM CHLORIDE 9 MG/ML
25 INJECTION, SOLUTION INTRAVENOUS PRN
Status: DISCONTINUED | OUTPATIENT
Start: 2024-06-07 | End: 2024-06-08

## 2024-06-07 RX ORDER — NALBUPHINE HYDROCHLORIDE 20 MG/ML
10 INJECTION, SOLUTION INTRAMUSCULAR; INTRAVENOUS; SUBCUTANEOUS
Status: DISCONTINUED | OUTPATIENT
Start: 2024-06-07 | End: 2024-06-08

## 2024-06-07 RX ORDER — SODIUM CHLORIDE 0.9 % (FLUSH) 0.9 %
5-40 SYRINGE (ML) INJECTION EVERY 12 HOURS SCHEDULED
Status: DISCONTINUED | OUTPATIENT
Start: 2024-06-07 | End: 2024-06-08

## 2024-06-07 RX ORDER — SODIUM CHLORIDE, SODIUM LACTATE, POTASSIUM CHLORIDE, AND CALCIUM CHLORIDE .6; .31; .03; .02 G/100ML; G/100ML; G/100ML; G/100ML
1000 INJECTION, SOLUTION INTRAVENOUS PRN
Status: DISCONTINUED | OUTPATIENT
Start: 2024-06-07 | End: 2024-06-08

## 2024-06-07 RX ORDER — SODIUM CHLORIDE 0.9 % (FLUSH) 0.9 %
5-40 SYRINGE (ML) INJECTION PRN
Status: DISCONTINUED | OUTPATIENT
Start: 2024-06-07 | End: 2024-06-08

## 2024-06-07 RX ORDER — TERBUTALINE SULFATE 1 MG/ML
0.25 INJECTION, SOLUTION SUBCUTANEOUS
Status: DISCONTINUED | OUTPATIENT
Start: 2024-06-07 | End: 2024-06-08

## 2024-06-07 RX ORDER — SODIUM CHLORIDE, SODIUM LACTATE, POTASSIUM CHLORIDE, AND CALCIUM CHLORIDE .6; .31; .03; .02 G/100ML; G/100ML; G/100ML; G/100ML
500 INJECTION, SOLUTION INTRAVENOUS PRN
Status: DISCONTINUED | OUTPATIENT
Start: 2024-06-07 | End: 2024-06-08

## 2024-06-07 RX ORDER — FAMOTIDINE 10 MG/ML
20 INJECTION, SOLUTION INTRAVENOUS 2 TIMES DAILY
Status: DISCONTINUED | OUTPATIENT
Start: 2024-06-07 | End: 2024-06-08

## 2024-06-07 RX ADMIN — DEXTROSE MONOHYDRATE 5 MILLION UNITS: 5 INJECTION INTRAVENOUS at 21:20

## 2024-06-07 RX ADMIN — Medication 1 MILLI-UNITS/MIN: at 22:00

## 2024-06-07 NOTE — H&P
Obstetrics History and Physical    CC:   Chief Complaint   Patient presents with    Scheduled Induction       HPI: Merissa Whipple is a 18 y.o.  at 40w4d who presents for scheduled induction of labor.    Pregnancy has been complicated by pyelonephritis, circumvallate placenta, GBS positive, UTI during pregnancy, uterine size-dates discrepancy, rubella non-immune, varicella non-immune and + THC on first trimester UDS.    Doing well today.  +FM.  Denies VB, LOF, contractions.  Denies headache, vision changes, RUQ pain, increased LE edema.  Denies chest pain, shortness of breath, fever, chills, nausea, vomiting.     Review of Systems: The following ROS was otherwise negative, except as noted in the HPI: constitutional, HEENT, respiratory, cardiovascular, gastrointestinal, genitourinary, skin, musculoskeletal, neurological, psych    OBGYN Provider : KARINA Solis DO     Obstetrical History:  OB History    Para Term  AB Living   1 0 0 0 0 0   SAB IAB Ectopic Molar Multiple Live Births   0 0 0 0 0 0      # Outcome Date GA Lbr David/2nd Weight Sex Delivery Anes PTL Lv   1 Current              Past Medical History:   Past Medical History:   Diagnosis Date    Chronic kidney disease     history of UTI's, pyelonephritis    Circumvallate placenta     Depression     Major Depressive Disorder    PTSD (post-traumatic stress disorder)      Medications:  Prior to Admission medications    Medication Sig Start Date End Date Taking? Authorizing Provider   cephALEXin (KEFLEX) 250 MG capsule Take 1 capsule by mouth daily 24   Elidia Solis DO   Prenatal Vit-Fe Fumarate-FA (PRENATAL VITAMIN PO) Take by mouth    Provider, MD Giorgi      Allergies:  Patient has no known allergies.    Surgical History:  History reviewed. No pertinent surgical history.    Family History:  Family History   Problem Relation Age of Onset    Ovarian Cancer Mother     Cancer Mother      Social History:  Social History

## 2024-06-08 ENCOUNTER — ANESTHESIA (OUTPATIENT)
Dept: LABOR AND DELIVERY | Age: 19
DRG: 540 | End: 2024-06-08
Payer: COMMERCIAL

## 2024-06-08 ENCOUNTER — ANESTHESIA EVENT (OUTPATIENT)
Dept: LABOR AND DELIVERY | Age: 19
DRG: 540 | End: 2024-06-08
Payer: COMMERCIAL

## 2024-06-08 PROBLEM — Z98.891 S/P PRIMARY LOW TRANSVERSE C-SECTION: Status: ACTIVE | Noted: 2024-06-08

## 2024-06-08 LAB
APTT BLD: 24.2 SEC (ref 22.1–36.4)
DEPRECATED RDW RBC AUTO: 15.8 % (ref 12.4–15.4)
FIBRINOGEN PPP-MCNC: 374 MG/DL (ref 227–534)
HCT VFR BLD AUTO: 29.7 % (ref 36–48)
HCT VFR BLD AUTO: 31.1 % (ref 36–48)
HGB BLD-MCNC: 9.7 G/DL (ref 12–16)
HGB BLD-MCNC: 9.9 G/DL (ref 12–16)
INR PPP: 0.99 (ref 0.85–1.15)
MCH RBC QN AUTO: 27.6 PG (ref 26–34)
MCHC RBC AUTO-ENTMCNC: 32 G/DL (ref 31–36)
MCV RBC AUTO: 86.3 FL (ref 80–100)
PLATELET # BLD AUTO: 176 K/UL (ref 135–450)
PMV BLD AUTO: 10.4 FL (ref 5–10.5)
PROTHROMBIN TIME: 13.3 SEC (ref 11.9–14.9)
RBC # BLD AUTO: 3.6 M/UL (ref 4–5.2)
REAGIN+T PALLIDUM IGG+IGM SERPL-IMP: NORMAL
WBC # BLD AUTO: 22.7 K/UL (ref 4–11)

## 2024-06-08 PROCEDURE — 6370000000 HC RX 637 (ALT 250 FOR IP): Performed by: OBSTETRICS & GYNECOLOGY

## 2024-06-08 PROCEDURE — 6360000002 HC RX W HCPCS: Performed by: OBSTETRICS & GYNECOLOGY

## 2024-06-08 PROCEDURE — 3700000001 HC ADD 15 MINUTES (ANESTHESIA): Performed by: OBSTETRICS & GYNECOLOGY

## 2024-06-08 PROCEDURE — 88307 TISSUE EXAM BY PATHOLOGIST: CPT

## 2024-06-08 PROCEDURE — 7100000001 HC PACU RECOVERY - ADDTL 15 MIN: Performed by: OBSTETRICS & GYNECOLOGY

## 2024-06-08 PROCEDURE — 85018 HEMOGLOBIN: CPT

## 2024-06-08 PROCEDURE — 85384 FIBRINOGEN ACTIVITY: CPT

## 2024-06-08 PROCEDURE — 2500000003 HC RX 250 WO HCPCS: Performed by: OBSTETRICS & GYNECOLOGY

## 2024-06-08 PROCEDURE — 59514 CESAREAN DELIVERY ONLY: CPT | Performed by: OBSTETRICS & GYNECOLOGY

## 2024-06-08 PROCEDURE — 2580000003 HC RX 258: Performed by: OBSTETRICS & GYNECOLOGY

## 2024-06-08 PROCEDURE — 3700000000 HC ANESTHESIA ATTENDED CARE: Performed by: OBSTETRICS & GYNECOLOGY

## 2024-06-08 PROCEDURE — 2500000003 HC RX 250 WO HCPCS: Performed by: NURSE ANESTHETIST, CERTIFIED REGISTERED

## 2024-06-08 PROCEDURE — 85730 THROMBOPLASTIN TIME PARTIAL: CPT

## 2024-06-08 PROCEDURE — 2709999900 HC NON-CHARGEABLE SUPPLY: Performed by: OBSTETRICS & GYNECOLOGY

## 2024-06-08 PROCEDURE — 6360000002 HC RX W HCPCS: Performed by: NURSE ANESTHETIST, CERTIFIED REGISTERED

## 2024-06-08 PROCEDURE — 85027 COMPLETE CBC AUTOMATED: CPT

## 2024-06-08 PROCEDURE — 3609079900 HC CESAREAN SECTION: Performed by: OBSTETRICS & GYNECOLOGY

## 2024-06-08 PROCEDURE — 7100000000 HC PACU RECOVERY - FIRST 15 MIN: Performed by: OBSTETRICS & GYNECOLOGY

## 2024-06-08 PROCEDURE — 1220000000 HC SEMI PRIVATE OB R&B

## 2024-06-08 PROCEDURE — 85014 HEMATOCRIT: CPT

## 2024-06-08 PROCEDURE — 85610 PROTHROMBIN TIME: CPT

## 2024-06-08 PROCEDURE — 36415 COLL VENOUS BLD VENIPUNCTURE: CPT

## 2024-06-08 RX ORDER — TRANEXAMIC ACID 10 MG/ML
1000 INJECTION, SOLUTION INTRAVENOUS
Status: ACTIVE | OUTPATIENT
Start: 2024-06-08 | End: 2024-06-09

## 2024-06-08 RX ORDER — ONDANSETRON 2 MG/ML
4 INJECTION INTRAMUSCULAR; INTRAVENOUS EVERY 6 HOURS PRN
Status: DISCONTINUED | OUTPATIENT
Start: 2024-06-08 | End: 2024-06-11 | Stop reason: HOSPADM

## 2024-06-08 RX ORDER — DOCUSATE SODIUM 100 MG/1
100 CAPSULE, LIQUID FILLED ORAL 2 TIMES DAILY
Status: DISCONTINUED | OUTPATIENT
Start: 2024-06-08 | End: 2024-06-11 | Stop reason: HOSPADM

## 2024-06-08 RX ORDER — SODIUM CHLORIDE 0.9 % (FLUSH) 0.9 %
5-40 SYRINGE (ML) INJECTION EVERY 12 HOURS SCHEDULED
Status: DISCONTINUED | OUTPATIENT
Start: 2024-06-08 | End: 2024-06-11 | Stop reason: HOSPADM

## 2024-06-08 RX ORDER — EPHEDRINE SULFATE 50 MG/ML
INJECTION INTRAVENOUS PRN
Status: SHIPPED | OUTPATIENT
Start: 2024-06-08

## 2024-06-08 RX ORDER — SODIUM CHLORIDE, SODIUM LACTATE, POTASSIUM CHLORIDE, AND CALCIUM CHLORIDE .6; .31; .03; .02 G/100ML; G/100ML; G/100ML; G/100ML
1000 INJECTION, SOLUTION INTRAVENOUS ONCE
Status: DISCONTINUED | OUTPATIENT
Start: 2024-06-08 | End: 2024-06-08

## 2024-06-08 RX ORDER — PROPOFOL 10 MG/ML
INJECTION, EMULSION INTRAVENOUS PRN
Status: DISCONTINUED | OUTPATIENT
Start: 2024-06-08 | End: 2024-06-08 | Stop reason: SDUPTHER

## 2024-06-08 RX ORDER — FAMOTIDINE 10 MG/ML
20 INJECTION, SOLUTION INTRAVENOUS ONCE
Status: DISCONTINUED | OUTPATIENT
Start: 2024-06-08 | End: 2024-06-08

## 2024-06-08 RX ORDER — SODIUM CHLORIDE 9 MG/ML
INJECTION, SOLUTION INTRAVENOUS PRN
Status: DISCONTINUED | OUTPATIENT
Start: 2024-06-08 | End: 2024-06-11 | Stop reason: HOSPADM

## 2024-06-08 RX ORDER — SODIUM CHLORIDE, SODIUM LACTATE, POTASSIUM CHLORIDE, CALCIUM CHLORIDE 600; 310; 30; 20 MG/100ML; MG/100ML; MG/100ML; MG/100ML
INJECTION, SOLUTION INTRAVENOUS CONTINUOUS
Status: DISCONTINUED | OUTPATIENT
Start: 2024-06-08 | End: 2024-06-08

## 2024-06-08 RX ORDER — SODIUM CHLORIDE 0.9 % (FLUSH) 0.9 %
10 SYRINGE (ML) INJECTION PRN
Status: DISCONTINUED | OUTPATIENT
Start: 2024-06-08 | End: 2024-06-08

## 2024-06-08 RX ORDER — BUPIVACAINE HYDROCHLORIDE 2.5 MG/ML
INJECTION, SOLUTION EPIDURAL; INFILTRATION; INTRACAUDAL
Status: COMPLETED
Start: 2024-06-08 | End: 2024-06-08

## 2024-06-08 RX ORDER — ONDANSETRON 2 MG/ML
INJECTION INTRAMUSCULAR; INTRAVENOUS PRN
Status: DISCONTINUED | OUTPATIENT
Start: 2024-06-08 | End: 2024-06-08 | Stop reason: SDUPTHER

## 2024-06-08 RX ORDER — MORPHINE SULFATE 0.5 MG/ML
INJECTION, SOLUTION EPIDURAL; INTRATHECAL; INTRAVENOUS PRN
Status: DISCONTINUED | OUTPATIENT
Start: 2024-06-08 | End: 2024-06-08 | Stop reason: SDUPTHER

## 2024-06-08 RX ORDER — ONDANSETRON 2 MG/ML
4 INJECTION INTRAMUSCULAR; INTRAVENOUS EVERY 6 HOURS PRN
Status: DISCONTINUED | OUTPATIENT
Start: 2024-06-08 | End: 2024-06-08

## 2024-06-08 RX ORDER — SODIUM CHLORIDE 0.9 % (FLUSH) 0.9 %
5-40 SYRINGE (ML) INJECTION PRN
Status: DISCONTINUED | OUTPATIENT
Start: 2024-06-08 | End: 2024-06-11 | Stop reason: HOSPADM

## 2024-06-08 RX ORDER — AZITHROMYCIN 500 MG/1
INJECTION, POWDER, LYOPHILIZED, FOR SOLUTION INTRAVENOUS
Status: DISCONTINUED
Start: 2024-06-08 | End: 2024-06-08

## 2024-06-08 RX ORDER — SODIUM CHLORIDE 0.9 % (FLUSH) 0.9 %
5-40 SYRINGE (ML) INJECTION EVERY 12 HOURS SCHEDULED
Status: DISCONTINUED | OUTPATIENT
Start: 2024-06-08 | End: 2024-06-08

## 2024-06-08 RX ORDER — BUPIVACAINE HYDROCHLORIDE 2.5 MG/ML
INJECTION, SOLUTION EPIDURAL; INFILTRATION; INTRACAUDAL PRN
Status: DISCONTINUED | OUTPATIENT
Start: 2024-06-08 | End: 2024-06-08 | Stop reason: SDUPTHER

## 2024-06-08 RX ORDER — PHENYLEPHRINE HCL IN 0.9% NACL 1 MG/10 ML
SYRINGE (ML) INTRAVENOUS PRN
Status: DISCONTINUED | OUTPATIENT
Start: 2024-06-08 | End: 2024-06-08 | Stop reason: SDUPTHER

## 2024-06-08 RX ORDER — IBUPROFEN 800 MG/1
800 TABLET ORAL EVERY 8 HOURS
Status: DISCONTINUED | OUTPATIENT
Start: 2024-06-09 | End: 2024-06-11 | Stop reason: HOSPADM

## 2024-06-08 RX ORDER — LIDOCAINE HYDROCHLORIDE AND EPINEPHRINE BITARTRATE 20; .01 MG/ML; MG/ML
INJECTION, SOLUTION SUBCUTANEOUS PRN
Status: DISCONTINUED | OUTPATIENT
Start: 2024-06-08 | End: 2024-06-08 | Stop reason: SDUPTHER

## 2024-06-08 RX ORDER — FERROUS SULFATE 325(65) MG
325 TABLET ORAL EVERY OTHER DAY
Status: DISCONTINUED | OUTPATIENT
Start: 2024-06-08 | End: 2024-06-11 | Stop reason: HOSPADM

## 2024-06-08 RX ORDER — OXYCODONE HYDROCHLORIDE 5 MG/1
5 TABLET ORAL EVERY 4 HOURS PRN
Status: DISCONTINUED | OUTPATIENT
Start: 2024-06-08 | End: 2024-06-11 | Stop reason: HOSPADM

## 2024-06-08 RX ORDER — FENTANYL CITRATE 50 UG/ML
INJECTION, SOLUTION INTRAMUSCULAR; INTRAVENOUS PRN
Status: DISCONTINUED | OUTPATIENT
Start: 2024-06-08 | End: 2024-06-08 | Stop reason: SDUPTHER

## 2024-06-08 RX ORDER — EPHEDRINE SULFATE 50 MG/ML
INJECTION INTRAVENOUS PRN
Status: DISCONTINUED | OUTPATIENT
Start: 2024-06-08 | End: 2024-06-08 | Stop reason: SDUPTHER

## 2024-06-08 RX ORDER — ACETAMINOPHEN 500 MG
1000 TABLET ORAL EVERY 8 HOURS SCHEDULED
Status: DISCONTINUED | OUTPATIENT
Start: 2024-06-08 | End: 2024-06-11 | Stop reason: HOSPADM

## 2024-06-08 RX ORDER — ONDANSETRON 4 MG/1
4 TABLET, ORALLY DISINTEGRATING ORAL EVERY 8 HOURS PRN
Status: DISCONTINUED | OUTPATIENT
Start: 2024-06-08 | End: 2024-06-11 | Stop reason: HOSPADM

## 2024-06-08 RX ORDER — TRANEXAMIC ACID 10 MG/ML
1000 INJECTION, SOLUTION INTRAVENOUS ONCE
Status: DISCONTINUED | OUTPATIENT
Start: 2024-06-08 | End: 2024-06-08

## 2024-06-08 RX ORDER — SODIUM CHLORIDE 9 MG/ML
INJECTION, SOLUTION INTRAVENOUS PRN
Status: DISCONTINUED | OUTPATIENT
Start: 2024-06-08 | End: 2024-06-08

## 2024-06-08 RX ORDER — SODIUM CHLORIDE, SODIUM LACTATE, POTASSIUM CHLORIDE, CALCIUM CHLORIDE 600; 310; 30; 20 MG/100ML; MG/100ML; MG/100ML; MG/100ML
INJECTION, SOLUTION INTRAVENOUS CONTINUOUS
Status: DISCONTINUED | OUTPATIENT
Start: 2024-06-08 | End: 2024-06-11 | Stop reason: HOSPADM

## 2024-06-08 RX ORDER — DIPHENHYDRAMINE HCL 25 MG
25 TABLET ORAL EVERY 6 HOURS PRN
Status: DISCONTINUED | OUTPATIENT
Start: 2024-06-08 | End: 2024-06-11 | Stop reason: HOSPADM

## 2024-06-08 RX ORDER — LANOLIN 100 %
OINTMENT (GRAM) TOPICAL
Status: DISCONTINUED | OUTPATIENT
Start: 2024-06-08 | End: 2024-06-11 | Stop reason: HOSPADM

## 2024-06-08 RX ORDER — EPHEDRINE SULFATE 50 MG/ML
INJECTION INTRAVENOUS
Status: COMPLETED
Start: 2024-06-08 | End: 2024-06-08

## 2024-06-08 RX ORDER — CEFAZOLIN SODIUM IN 0.9 % NACL 2 G/100 ML
2000 PLASTIC BAG, INJECTION (ML) INTRAVENOUS ONCE
Status: DISCONTINUED | OUTPATIENT
Start: 2024-06-08 | End: 2024-06-08

## 2024-06-08 RX ORDER — OXYTOCIN 10 [USP'U]/ML
INJECTION, SOLUTION INTRAMUSCULAR; INTRAVENOUS PRN
Status: DISCONTINUED | OUTPATIENT
Start: 2024-06-08 | End: 2024-06-08 | Stop reason: SDUPTHER

## 2024-06-08 RX ORDER — KETOROLAC TROMETHAMINE 30 MG/ML
30 INJECTION, SOLUTION INTRAMUSCULAR; INTRAVENOUS EVERY 6 HOURS
Status: DISPENSED | OUTPATIENT
Start: 2024-06-08 | End: 2024-06-09

## 2024-06-08 RX ORDER — ACETAMINOPHEN 325 MG/1
975 TABLET ORAL ONCE
Status: DISCONTINUED | OUTPATIENT
Start: 2024-06-08 | End: 2024-06-08

## 2024-06-08 RX ADMIN — METHYLERGONOVINE MALEATE 200 MCG: 0.2 INJECTION, SOLUTION INTRAMUSCULAR; INTRAVENOUS at 15:16

## 2024-06-08 RX ADMIN — SODIUM CHLORIDE, POTASSIUM CHLORIDE, SODIUM LACTATE AND CALCIUM CHLORIDE: 600; 310; 30; 20 INJECTION, SOLUTION INTRAVENOUS at 13:12

## 2024-06-08 RX ADMIN — EPHEDRINE SULFATE 35 MG: 50 INJECTION INTRAVENOUS at 14:14

## 2024-06-08 RX ADMIN — ACETAMINOPHEN 1000 MG: 500 TABLET ORAL at 20:19

## 2024-06-08 RX ADMIN — Medication 87.3 MILLI-UNITS/MIN: at 13:55

## 2024-06-08 RX ADMIN — SODIUM CHLORIDE, POTASSIUM CHLORIDE, SODIUM LACTATE AND CALCIUM CHLORIDE: 600; 310; 30; 20 INJECTION, SOLUTION INTRAVENOUS at 11:43

## 2024-06-08 RX ADMIN — BUPIVACAINE HYDROCHLORIDE 1 ML: 2.5 INJECTION, SOLUTION EPIDURAL; INFILTRATION; INTRACAUDAL; PERINEURAL at 03:58

## 2024-06-08 RX ADMIN — SODIUM CHLORIDE, POTASSIUM CHLORIDE, SODIUM LACTATE AND CALCIUM CHLORIDE: 600; 310; 30; 20 INJECTION, SOLUTION INTRAVENOUS at 05:59

## 2024-06-08 RX ADMIN — Medication 10 ML: at 22:39

## 2024-06-08 RX ADMIN — SODIUM CHLORIDE, POTASSIUM CHLORIDE, SODIUM LACTATE AND CALCIUM CHLORIDE: 600; 310; 30; 20 INJECTION, SOLUTION INTRAVENOUS at 03:18

## 2024-06-08 RX ADMIN — EPHEDRINE SULFATE 15 MG: 50 INJECTION INTRAVENOUS at 09:36

## 2024-06-08 RX ADMIN — DOCUSATE SODIUM 100 MG: 100 CAPSULE, LIQUID FILLED ORAL at 20:20

## 2024-06-08 RX ADMIN — AZITHROMYCIN MONOHYDRATE 500 MG: 500 INJECTION, POWDER, LYOPHILIZED, FOR SOLUTION INTRAVENOUS at 12:37

## 2024-06-08 RX ADMIN — KETOROLAC TROMETHAMINE 30 MG: 30 INJECTION, SOLUTION INTRAMUSCULAR at 22:39

## 2024-06-08 RX ADMIN — ONDANSETRON 4 MG: 2 INJECTION INTRAMUSCULAR; INTRAVENOUS at 15:46

## 2024-06-08 RX ADMIN — SODIUM CHLORIDE, POTASSIUM CHLORIDE, SODIUM LACTATE AND CALCIUM CHLORIDE: 600; 310; 30; 20 INJECTION, SOLUTION INTRAVENOUS at 21:07

## 2024-06-08 RX ADMIN — TRANEXAMIC ACID 1000 MG: 100 INJECTION, SOLUTION INTRAVENOUS at 14:32

## 2024-06-08 RX ADMIN — Medication 2.5 MILLION UNITS: at 09:06

## 2024-06-08 RX ADMIN — SODIUM CHLORIDE, POTASSIUM CHLORIDE, SODIUM LACTATE AND CALCIUM CHLORIDE: 600; 310; 30; 20 INJECTION, SOLUTION INTRAVENOUS at 13:55

## 2024-06-08 RX ADMIN — Medication 100 MCG: at 13:07

## 2024-06-08 RX ADMIN — Medication 2.5 MILLION UNITS: at 01:17

## 2024-06-08 RX ADMIN — Medication 100 MCG: at 13:00

## 2024-06-08 RX ADMIN — OXYTOCIN 20 UNITS: 10 INJECTION INTRAVENOUS at 12:55

## 2024-06-08 RX ADMIN — LIDOCAINE HYDROCHLORIDE AND EPINEPHRINE 8 ML: 20; 10 INJECTION, SOLUTION INFILTRATION; PERINEURAL at 12:17

## 2024-06-08 RX ADMIN — LIDOCAINE HYDROCHLORIDE AND EPINEPHRINE 3 ML: 20; 10 INJECTION, SOLUTION INFILTRATION; PERINEURAL at 12:36

## 2024-06-08 RX ADMIN — Medication 15 ML/HR: at 04:02

## 2024-06-08 RX ADMIN — EPHEDRINE SULFATE 15 MG: 50 INJECTION INTRAVENOUS at 14:13

## 2024-06-08 RX ADMIN — PROPOFOL 10 MG: 10 INJECTION, EMULSION INTRAVENOUS at 13:11

## 2024-06-08 RX ADMIN — Medication 2.5 MILLION UNITS: at 05:22

## 2024-06-08 RX ADMIN — ONDANSETRON 4 MG: 2 INJECTION INTRAMUSCULAR; INTRAVENOUS at 12:33

## 2024-06-08 RX ADMIN — KETOROLAC TROMETHAMINE 30 MG: 30 INJECTION, SOLUTION INTRAMUSCULAR at 15:47

## 2024-06-08 RX ADMIN — FENTANYL CITRATE 100 MCG: 50 INJECTION, SOLUTION INTRAMUSCULAR; INTRAVENOUS at 12:33

## 2024-06-08 RX ADMIN — Medication 100 MCG: at 13:12

## 2024-06-08 RX ADMIN — MORPHINE SULFATE 2 MG: 0.5 INJECTION EPIDURAL; INTRATHECAL; INTRAVENOUS at 12:57

## 2024-06-08 RX ADMIN — EPHEDRINE SULFATE 35 MG: 50 INJECTION INTRAVENOUS at 09:37

## 2024-06-08 RX ADMIN — OXYTOCIN 10 UNITS: 10 INJECTION INTRAVENOUS at 13:12

## 2024-06-08 ASSESSMENT — PAIN SCALES - GENERAL
PAINLEVEL_OUTOF10: 5
PAINLEVEL_OUTOF10: 4
PAINLEVEL_OUTOF10: 5

## 2024-06-08 ASSESSMENT — PAIN DESCRIPTION - ORIENTATION
ORIENTATION: LOWER
ORIENTATION: LOWER;MID;LEFT

## 2024-06-08 ASSESSMENT — PAIN - FUNCTIONAL ASSESSMENT
PAIN_FUNCTIONAL_ASSESSMENT: PREVENTS OR INTERFERES SOME ACTIVE ACTIVITIES AND ADLS
PAIN_FUNCTIONAL_ASSESSMENT: ACTIVITIES ARE NOT PREVENTED
PAIN_FUNCTIONAL_ASSESSMENT: PREVENTS OR INTERFERES SOME ACTIVE ACTIVITIES AND ADLS

## 2024-06-08 ASSESSMENT — PAIN DESCRIPTION - LOCATION
LOCATION: ABDOMEN;INCISION
LOCATION: ABDOMEN
LOCATION: ABDOMEN

## 2024-06-08 ASSESSMENT — PAIN DESCRIPTION - DESCRIPTORS
DESCRIPTORS: SORE
DESCRIPTORS: SORE;SHARP
DESCRIPTORS: CRAMPING;SORE

## 2024-06-08 NOTE — ANESTHESIA PRE PROCEDURE
Department of Anesthesiology  Preprocedure Note       Name:  Merissa Whipple   Age:  18 y.o.  :  2005                                          MRN:  [id number]         Date:  2024      Surgeon: * No surgeons listed *    Procedure: * No procedures listed *    Medications prior to admission:   Prior to Admission medications    Medication Sig Start Date End Date Taking? Authorizing Provider   cephALEXin (KEFLEX) 250 MG capsule Take 1 capsule by mouth daily 24   Elidia Solis,    Prenatal Vit-Fe Fumarate-FA (PRENATAL VITAMIN PO) Take by mouth    Provider, MD Giorgi       Current medications:    Current Facility-Administered Medications   Medication Dose Route Frequency Provider Last Rate Last Admin    lactated ringers IV soln infusion   IntraVENous Continuous Marla Acevedo  mL/hr at 24 0318 New Bag at 24 0318    lactated ringers IV soln infusion   IntraVENous Continuous Marla Acevedo DO        lactated ringers bolus 500 mL  500 mL IntraVENous PRN Marla Acevedo DO        Or    lactated ringers bolus 1,000 mL  1,000 mL IntraVENous PRN Marla Acevedo,         sodium chloride flush 0.9 % injection 5-40 mL  5-40 mL IntraVENous 2 times per day Marla Acevedo DO        sodium chloride flush 0.9 % injection 5-40 mL  5-40 mL IntraVENous PRN Marla Acevedo DO        0.9 % sodium chloride infusion  25 mL IntraVENous PRN Malra Acevedo,         methylergonovine (METHERGINE) injection 200 mcg  200 mcg IntraMUSCular PRN Marla Acevedo DO        carboprost (HEMABATE) injection 250 mcg  250 mcg IntraMUSCular PRN Marla Acevedo,         miSOPROStol (CYTOTEC) tablet 400 mcg  400 mcg Buccal PRN Marla Acevedo,         tranexamic acid-NaCl IVPB premix 1,000 mg  1,000 mg IntraVENous Once PRN Marla Acevedo DO        oxytocin (PITOCIN) 30 units in 500 mL infusion  87.3 tamir-units/min IntraVENous Continuous PRCATHI Acevedo

## 2024-06-08 NOTE — PROGRESS NOTES
Brief Labor note:     18 y.o.  @ 40w5d, here for IOL.   Pt seen and examined.   Called to room for persistent Decels despite position change.  She is now in high fowlers and FHT has Improved. Cat 1   Pt admits to anxiety about fetal wellbeing.   Pt herself feels well, Denies HA / vision changes, RUQ pain or worsening edema at this time.     FHT:   140 bpm   mod variability  (+) accels, (--) decels now   Contractions q 2-4 min     IV Pitocin: 0    Plan:  - discussed options with patient including 1) continued expectant mng with position change and IV pitocin if able. She is anxious about intermittent Cat 2 tracing. Also discussed 2) PLTCS for fetal intolerance to labor. She is amenable to the latter.   - Staff and OR notified   - Abx ordered   - Proceed to OR for PLTCS due to TYRELL     Please call with questions or concerns   Ranjana Smith, DO

## 2024-06-08 NOTE — PROGRESS NOTES
Patient placed on EFM at this time.    Patient reports feeling her baby move today. Patient denies leaking of fluid, contractions, and/or vaginal bleeding.

## 2024-06-08 NOTE — PROGRESS NOTES
Labor Progress Note  [facility] Ob[facility]    Subjective: Pt seen and examined. Doing well, no concerns/complaints    Objective:  BP (!) 98/54   Pulse 76   Temp 97.7 °F (36.5 °C) (Oral)   Resp 16   LMP 08/28/2023   SpO2 98%     Cervix:  Dilation (cm): 5  Effacement: 80  Cervical Characteristics: Posterior  Station: -2  OB Examiner: KENIA Regan RN    Baseline: 120-125  Variability: moderate in degree  Accelerations: Present just prior to onset, not since  Decelerations: Abrupt 6 minute deceleration with melva to 60 bpm following position change. Resolved with stopping pitocin, position change and IV bolus.     Kinsey: Contractions are present every 1-3 min    Category 2    Infusions:    lactated ringers IV soln 125 mL/hr at 06/08/24 0318    lactated ringers IV soln      sodium chloride      oxytocin      oxytocin 6 tamir-units/min (06/08/24 0530)     ?  Assessment/Plan:  1. Intrapartum  - FHTs category 2 with prolonged deceleration - likely due to a combination of low BP (90s/60s) after epidural, frequency of contractions (1-2 min at that time) and position change (abrupt onset as though cord compression).  Has improved with discontinuation of pitocin, IV fluid bolus, repositioning  - Will continue to monitor at this time  - If contractions do not space out or recurrence occurs will consider AROM with IUPC and FSE placement and terbutaline   - Continue expectant mgmt.    Marla Acevedo, DO

## 2024-06-08 NOTE — PROGRESS NOTES
Dr Smith updated in department of pt having moderate gush of blood, and another gush with fundal massage. Fundus was firmed with massage, and bleeding returned to normal. RN received order to give IM Methergine. No other orders at this time.

## 2024-06-08 NOTE — PROGRESS NOTES
Brief Labor note:     18 y.o.  @ 40w5d, here for IOL.   Pt seen and examined. Doing well.  Denies HA / vision changes, RUQ pain or worsening edema at this time.     FHT:   130 bpm   mod variability  (+) accels, (--) decels   Contractions q 2-4 min     VE:    5/80/-2   AROM clear     IV Pitocin: 0mU     Plan:  Continue IV Pitocin per protocol   Reassuring fetal status   Continuous fetal monitoring     Please call with questions or concerns   Ranjana Smith,

## 2024-06-08 NOTE — L&D DELIVERY SUMMARY NOTE
Department of Obstetrics and Gynecology  Obstetrical Operative Report    Pre-operative Diagnosis:    18 y.o.  40w5d  MIOL due to post dates / circumvallate placenta   Fetal intolerance to labor   Maternal request   GBS (+) status     Post-operative Diagnosis:    Fetal malposition, difficult extraction     Procedure: primary low transverse  section    Surgeon: Ranjana Smith DO      Assistant(s):  YAMILA Haynes SA     Anesthesia:  Epidural anesthesia    Findings:    Live Born Sex:  Male  Fetal Position:  Cephalic, occiput posterior  Apgars:  1 minute:  8; 5 minute:  9  Weight:  3.555 kg (7 lb 13.4 oz)   Tubes, uterus, ovaries:  normal     Total IV fluids:  1300 ml    Urine Output:  200 ml clear urine s/p procedure     Quant blood loss:  1066 mL    Blood Transfusion?:  No     Drains:  none    Specimens:  placenta sent to pathology    Complications:  difficult fetal extraction     Condition:    Infant stable, transfer to General Care Nursery  Mother stable, transfer to labor & delivery room    INDICATIONS FOR PROCEDURE:  The patient is a 18 y.o.  40w5d who was admitted for MIOL due to post dates with circumvallate placenta. She has been on IV pitocin, AROM'd clear fluid, internal monitors placed. Fetal heart rate intermittent cat 2 tracing.  Risks, benefits, and alternatives of C Section were discussed.  She verbalized understanding and elected to proceed with PLTCS due to concerns of fetal wellbeing. Consents were signed, and the patient was prepared for surgery.    DESCRIPTION OF THE PROCEDURE:  The patient was taken to the operating  room where epidural was re-dosed and found to be adequate.  She was  placed on the operating table in the dorsal supine position with a  leftward tilt.  She was prepped and draped in the normal sterile  fashion.  Universal time-out was conducted.  All parties agreed to  accurate information. Ancef and Zithromax were given prior to incision.     Pfannenstiel

## 2024-06-08 NOTE — PROGRESS NOTES
Brief Labor note:     18 y.o.  @ 40w5d, here for IOL.   Pt seen and examined.   Called to room due to second prolonged decel within 15 min   Decel noted, about 2 min in length   Resolved with repositioning and IVF, O2 and turning off IV Pitocin   Pt herself feels well, Denies HA / vision changes, RUQ pain or worsening edema at this time.     FHT:   140 bpm   mod variability  (+) accels, (--) decels now   Contractions q 2-4 min     VE:    6/80/-2 on recheck   Internal monitors placed     IV Pitocin: 0    Plan:  - fetus maintains mod variability with accelerations despite the decels, Internal monitors in place now, I do not suspect acidemia/fetal compromise at this time   - VE also reassuring with some change since last exam    - Recommend restarting IV pitocin in 30 min if continues to be Cat 1     Please call with questions or concerns   Ranjana Smith, DO

## 2024-06-09 LAB
HCT VFR BLD AUTO: 29.8 % (ref 36–48)
HGB BLD-MCNC: 9.7 G/DL (ref 12–16)

## 2024-06-09 PROCEDURE — 1220000000 HC SEMI PRIVATE OB R&B

## 2024-06-09 PROCEDURE — 2580000003 HC RX 258: Performed by: OBSTETRICS & GYNECOLOGY

## 2024-06-09 PROCEDURE — 85014 HEMATOCRIT: CPT

## 2024-06-09 PROCEDURE — 99024 POSTOP FOLLOW-UP VISIT: CPT | Performed by: OBSTETRICS & GYNECOLOGY

## 2024-06-09 PROCEDURE — 85018 HEMOGLOBIN: CPT

## 2024-06-09 PROCEDURE — 6370000000 HC RX 637 (ALT 250 FOR IP): Performed by: OBSTETRICS & GYNECOLOGY

## 2024-06-09 PROCEDURE — 36415 COLL VENOUS BLD VENIPUNCTURE: CPT

## 2024-06-09 PROCEDURE — 6360000002 HC RX W HCPCS: Performed by: OBSTETRICS & GYNECOLOGY

## 2024-06-09 RX ADMIN — KETOROLAC TROMETHAMINE 30 MG: 30 INJECTION, SOLUTION INTRAMUSCULAR at 04:58

## 2024-06-09 RX ADMIN — IBUPROFEN 800 MG: 800 TABLET, FILM COATED ORAL at 21:53

## 2024-06-09 RX ADMIN — OXYCODONE HYDROCHLORIDE 5 MG: 5 TABLET ORAL at 12:18

## 2024-06-09 RX ADMIN — Medication 10 ML: at 09:16

## 2024-06-09 RX ADMIN — OXYCODONE HYDROCHLORIDE 5 MG: 5 TABLET ORAL at 17:41

## 2024-06-09 RX ADMIN — DOCUSATE SODIUM 100 MG: 100 CAPSULE, LIQUID FILLED ORAL at 09:14

## 2024-06-09 RX ADMIN — ACETAMINOPHEN 1000 MG: 500 TABLET ORAL at 09:15

## 2024-06-09 RX ADMIN — DOCUSATE SODIUM 100 MG: 100 CAPSULE, LIQUID FILLED ORAL at 21:53

## 2024-06-09 RX ADMIN — ACETAMINOPHEN 1000 MG: 500 TABLET ORAL at 17:42

## 2024-06-09 RX ADMIN — IBUPROFEN 800 MG: 800 TABLET, FILM COATED ORAL at 14:11

## 2024-06-09 RX ADMIN — DIPHENHYDRAMINE HYDROCHLORIDE 25 MG: 25 TABLET ORAL at 05:04

## 2024-06-09 RX ADMIN — OXYCODONE HYDROCHLORIDE 5 MG: 5 TABLET ORAL at 23:05

## 2024-06-09 ASSESSMENT — PAIN DESCRIPTION - DESCRIPTORS
DESCRIPTORS: ACHING
DESCRIPTORS: SHARP
DESCRIPTORS: ACHING

## 2024-06-09 ASSESSMENT — PAIN DESCRIPTION - LOCATION
LOCATION: ABDOMEN

## 2024-06-09 ASSESSMENT — PAIN SCALES - GENERAL
PAINLEVEL_OUTOF10: 5
PAINLEVEL_OUTOF10: 7
PAINLEVEL_OUTOF10: 5
PAINLEVEL_OUTOF10: 7
PAINLEVEL_OUTOF10: 4
PAINLEVEL_OUTOF10: 4

## 2024-06-09 ASSESSMENT — PAIN - FUNCTIONAL ASSESSMENT
PAIN_FUNCTIONAL_ASSESSMENT: ACTIVITIES ARE NOT PREVENTED

## 2024-06-09 ASSESSMENT — PAIN DESCRIPTION - ORIENTATION: ORIENTATION: LOWER

## 2024-06-09 NOTE — PROGRESS NOTES
[facility] Ob/[facility]  Post Partum Progress Note    Subjective:   Patient is a 18 y.o. y/o  female S/P PCS for TYRELL at 40w5d EGA. PPD # 1.     Pregnancy has been complicated by pyelonephritis, circumvallate placenta, GBS positive, UTI during pregnancy, uterine size-dates discrepancy, rubella non-immune, varicella non-immune and + THC on first trimester UDS.     Doing well today. No current complaints are noted including headache, change in vision, fever, chills, chest pain, shortness of breath, nausea, vomiting, diarrhea or constipation. The patient denies dizziness with ambulation or calf tenderness. Voiding spontaneously. Lochia is described as moderate. The patient plans to bottle feed.    Objective:   GENERAL APPEARANCE: alert, well appearing, in no apparent distress  LUNGS: Resp effort normal   HEART: Reg rate   ABDOMEN POSTPARTUM: benign non-tender, without masses or organomegaly palpable . Uterine Fundus firm, NT, Below umbilicus.  Incision Dry/Clean/Intact w/ bandage in place.  EXTREMITIES: no redness or tenderness in the calves or thighs, no edema  SKIN: normal coloration and turgor, no rashes, no suspicious skin lesions noted    Pertinent Labs:   H/H: 11.4/34.3 > 9.7/29.8    Assessment / Plan:  1) Post partum    - meeting post partum milestones    - hemodynamically stable     2) O+ / RI / GBS (+)   - s/p PCN in labor     3) Baby Information (Delivered 2024 1253)   MRN: [id number] Bed: 378-01N    Delivering clinician: Ranjana Smith DO GA: 40w5d Delivery method: , Low Transverse   Birth weight: 3.555 kg (7 lb 13.4 oz) Apgars 1, 5, 10 min: 8, 9, -- Forceps?: No   Blood type: O POS            Disposition:   Post Partum Instructions reviewed   Anticipate discharge on PPD # 2-3 pending clinical status     Ranjana Smith DO

## 2024-06-09 NOTE — FLOWSHEET NOTE
Patient up for the first time since delivery with assist of 2 staff members. Patient tolerated well with no c/o dizziness at this time. Pad changed, tin care provided, gown changed. Assisted patient to chair to sit. Bed linens changed and comfort mattress applied. Denies any needs at this time. Call light within reach will continue to monitor.

## 2024-06-09 NOTE — FLOWSHEET NOTE
Pt LR order d/c'd. Pt has not taken in much po due to nausea earlier. Pt denies nausea but nervous about drinking and causing more nausea. Spoke with Dr. Smith and LR at 125ml/hr ordered. Ok to D/c once taking in po well.

## 2024-06-09 NOTE — LACTATION NOTE
This note was copied from a baby's chart.  LACTATION CONSULTATION      Follow-up Consult: Reason for Follow-up: education, MOB reports that she would like to change to formula       Name: Alex Whipple       MRN: [id number]               YOB: 2026   Time of Birth: 12:53 PM   Gestational age: Gestational Age: 40w5d   Birth Weight: Birth Weight: 3.555 kg (7 lb 13.4 oz) Most Recent Weight: Weight: 3.461 kg (7 lb 10.1 oz)   Weight Change from Birth: -3%            Maternal Assessment:      Maternal Data:   Information for the patient's mother:  Merissa Whipple [[id number]]   20 y.o.    /Para:   Information for the patient's mother:  Merissa Whipple [[id number]]        Information for the patient's mother:  Merissa Whipple [[id number]]   40w5d          Breast Assessment  Right Breast: Breasts not assessed this encounter     Left Breast: Breasts not assessed this encounter      Infant Assessment:    DOL:19 hours       Feeding: Breastfeeding  and MOB reports that she would like to change to formula feeding     Nipple Shield in Use: No     I&O adequacy:  Urine output: is established  Stool output: is established  Percent weight change from birthweight: -3%     Oral Assessment:   Oral assessment not completed at this time.     Intervention during consultation:     Interventions Performed:   Education     Latch & Positioning: MOB holding infant to the right breast at this time.     Education:  Engorgement prevention & management  and MOB reports that she would like to change to formula feeding. Asked MOB her hesitations to continue breastfeeding. MOB reports that she is having difficulty with not knowing if infant is getting enough and that it has been difficulty to latch. Discussed with MOB feeding options including continuing breastfeeding, breast pumping and/or providing formula. MOB verbalized education and requests to change to formula feeding. Provided with Similac, volufeeds and 
  Date Value Ref Range Status   10/15/2019 Neg Negative <300 ng/mL Final     pH, Urine   Date Value Ref Range Status   2024 6.0  Final     Comment:     Urine pH less than 5.0 or greater than 8.0 may indicate sample adulteration.  Another sample should be collected if clinically  indicated.       pH, UA   Date Value Ref Range Status   2024 7.0 5.0 - 8.0 Final     Drug Screen Comment:   Date Value Ref Range Status   2024 see below  Final     Comment:     This method is a screening test to detect only these drug  classes as part of a medical workup.  Confirmatory testing  by another method should be ordered if clinically indicated.          Other significant maternal history:     Delivery Information:  Born on 2026 at 12:53 PM  Delivery method: , Low Transverse [251]  Additional Information:  Forceps attempted? No [0]  Vacuum extractor attempted? No [0]  Breech:   Complications:     Infant Assessment:    DOL:Infant 1 hour old  Feeding: Breastfeeding     I&O adequacy:  Urine output: is not established  Stool output: is not established  Percent weight change from birthweight: 0%    Oral Assessment Did not do complete assessment, initiated first feeding.    Birth Factors/Diagnosis that could create risk for breastfeeding:     Glucose: No      Intervention During Consultation    Interventions Performed:    Breastfeeding Booklet Provided , Assisted with breastfeeding , Hand expression, Education , and Skin to skin     Latch & Positioning: Assisted mob with positioning infant to right breast in cradle hold. MOB needing hands on lactation assistance at this consult. Charge RN placing a second IV access line, mob having increased bleeding, being monitored.   MOB was shown how to bring infant onto breast with wide open mouth, and lc was able to support infant at breast to allow infant to feed. MOB confirms latch is comfortable with strong sucking present. Infant with coordinated sucking

## 2024-06-09 NOTE — FLOWSHEET NOTE
Called Dr. Smith and notified of latest H & H. Spoke with her regarding pt's second IV in her left hand that continues to need the dressing reinforced due to patient sweating and is in a hard spot for breastfeeding. MD comfortable with D/Cing this second IV. MD also ordered to d/c serial H & H's and get one in the am. Will order and notify patient.

## 2024-06-10 PROCEDURE — 99024 POSTOP FOLLOW-UP VISIT: CPT | Performed by: OBSTETRICS & GYNECOLOGY

## 2024-06-10 PROCEDURE — 6370000000 HC RX 637 (ALT 250 FOR IP): Performed by: OBSTETRICS & GYNECOLOGY

## 2024-06-10 PROCEDURE — 1220000000 HC SEMI PRIVATE OB R&B

## 2024-06-10 PROCEDURE — 2580000003 HC RX 258: Performed by: OBSTETRICS & GYNECOLOGY

## 2024-06-10 RX ADMIN — FERROUS SULFATE TAB 325 MG (65 MG ELEMENTAL FE) 325 MG: 325 (65 FE) TAB at 08:35

## 2024-06-10 RX ADMIN — DOCUSATE SODIUM 100 MG: 100 CAPSULE, LIQUID FILLED ORAL at 08:35

## 2024-06-10 RX ADMIN — OXYCODONE HYDROCHLORIDE 5 MG: 5 TABLET ORAL at 11:00

## 2024-06-10 RX ADMIN — ACETAMINOPHEN 1000 MG: 500 TABLET ORAL at 20:56

## 2024-06-10 RX ADMIN — ACETAMINOPHEN 1000 MG: 500 TABLET ORAL at 01:58

## 2024-06-10 RX ADMIN — ACETAMINOPHEN 1000 MG: 500 TABLET ORAL at 12:19

## 2024-06-10 RX ADMIN — DOCUSATE SODIUM 100 MG: 100 CAPSULE, LIQUID FILLED ORAL at 20:57

## 2024-06-10 RX ADMIN — IBUPROFEN 800 MG: 800 TABLET, FILM COATED ORAL at 04:47

## 2024-06-10 RX ADMIN — IBUPROFEN 800 MG: 800 TABLET, FILM COATED ORAL at 16:08

## 2024-06-10 ASSESSMENT — PAIN DESCRIPTION - ORIENTATION
ORIENTATION: LOWER
ORIENTATION: LOWER
ORIENTATION: MID;LOWER
ORIENTATION: MID;LOWER
ORIENTATION: LOWER
ORIENTATION: LOWER;MID

## 2024-06-10 ASSESSMENT — PAIN DESCRIPTION - DESCRIPTORS
DESCRIPTORS: SHARP
DESCRIPTORS: ACHING;DISCOMFORT;CRAMPING
DESCRIPTORS: SORE
DESCRIPTORS: ACHING;BURNING;CRAMPING;DISCOMFORT
DESCRIPTORS: ACHING;DISCOMFORT;CRAMPING
DESCRIPTORS: SORE

## 2024-06-10 ASSESSMENT — PAIN SCALES - GENERAL
PAINLEVEL_OUTOF10: 4
PAINLEVEL_OUTOF10: 6
PAINLEVEL_OUTOF10: 6
PAINLEVEL_OUTOF10: 5
PAINLEVEL_OUTOF10: 3

## 2024-06-10 ASSESSMENT — PAIN - FUNCTIONAL ASSESSMENT
PAIN_FUNCTIONAL_ASSESSMENT: ACTIVITIES ARE NOT PREVENTED

## 2024-06-10 ASSESSMENT — PAIN DESCRIPTION - LOCATION
LOCATION: ABDOMEN
LOCATION: ABDOMEN;BACK
LOCATION: ABDOMEN
LOCATION: ABDOMEN;BACK

## 2024-06-10 NOTE — PROGRESS NOTES
PIV infiltrated. Dr. Downs notified of loss of access. No need to replace PIV at this time per MD Downs's verbal order.   Nilam Zuleta RN

## 2024-06-10 NOTE — PROGRESS NOTES
Report received from MIGDALIA Rivera RN. Bedside report given. Introduced myself to pt as her RN for the day. I put my name and phone number on the white board and showed pt how to use her room phone to get a hold of me. Pt was given her plan of care for the day. Call light within reach. Bed in lowest position and wheels are locked. Pt verbalized understanding and denies any further needs at this time.Continue to monitor.  Nilam Zuleta RN

## 2024-06-10 NOTE — PROGRESS NOTES
[facility] Ob/Gyn  Post Partum Progress Note     Subjective:   Patient is a 19 y/o  female S/P primary low transverse  for failed induction of labor at 40w5d EGA. PPD # 2.      Pregnancy has been complicated by pyelonephritis, circumvallate placenta, GBS positive, UTI during pregnancy, uterine size-dates discrepancy, rubella non-immune, varicella non-immune and + THC on first trimester UDS.      Doing well today. No current complaints are noted including headache, change in vision, fever, chills, chest pain, shortness of breath, nausea, vomiting, diarrhea or constipation. The patient denies dizziness with ambulation or calf tenderness. Voiding spontaneously. Lochia is described as moderate. The patient plans to bottle feed.  Admits to upper back and shoulder pain.      Objective:   Vitals:    24 1356 24 1942 06/10/24 0444 06/10/24 1039   BP: (!) 113/58 (!) 114/57 103/62 (!) 122/56   Pulse: 96 90 76 90   Resp: 18 16 18 18   Temp: 98.2 °F (36.8 °C) 98.2 °F (36.8 °C) 97.6 °F (36.4 °C) 98.1 °F (36.7 °C)   TempSrc: Oral Oral Oral Oral   SpO2:  99%  99%       GENERAL APPEARANCE: alert, well appearing, in no apparent distress  LUNGS: Resp effort normal   HEART: Reg rate   ABDOMEN POSTPARTUM: benign non-tender, without masses or organomegaly palpable . Uterine Fundus firm, NT, Below umbilicus.  Incision Dry/Clean/Intact  EXTREMITIES: no redness or tenderness in the calves or thighs, no edema  SKIN: normal coloration and turgor, no rashes, no suspicious skin lesions noted     Pertinent Labs:    Hemoglobin and Hematocrit  Order: [id number]  Status: Final result       Visible to patient: Yes (not seen)       Next appt: None    0 Result Notes            Component  Ref Range & Units 24 0620 24 2113 24 1427 24 2020 24 0100 3/22/24 0713 3/21/24 0719   Hemoglobin  12.0 - 16.0 g/dL 9.7 Low  9.7 Low  9.9 Low  11.4 Low  11.3 Low  10.2 Low  10.8 Low    Hematocrit  36.0 - 48.0 % 29.8 Low

## 2024-06-11 VITALS
TEMPERATURE: 98.4 F | OXYGEN SATURATION: 95 % | HEART RATE: 89 BPM | DIASTOLIC BLOOD PRESSURE: 50 MMHG | RESPIRATION RATE: 16 BRPM | SYSTOLIC BLOOD PRESSURE: 104 MMHG

## 2024-06-11 LAB
BLOOD BANK DISPENSE STATUS: NORMAL
BLOOD BANK PRODUCT CODE: NORMAL
BPU ID: NORMAL
DESCRIPTION BLOOD BANK: NORMAL

## 2024-06-11 PROCEDURE — 99024 POSTOP FOLLOW-UP VISIT: CPT | Performed by: OBSTETRICS & GYNECOLOGY

## 2024-06-11 PROCEDURE — 6370000000 HC RX 637 (ALT 250 FOR IP): Performed by: OBSTETRICS & GYNECOLOGY

## 2024-06-11 RX ORDER — OXYCODONE HYDROCHLORIDE 5 MG/1
5 TABLET ORAL EVERY 4 HOURS PRN
Qty: 28 TABLET | Refills: 0 | Status: SHIPPED | OUTPATIENT
Start: 2024-06-11 | End: 2024-06-14

## 2024-06-11 RX ORDER — FERROUS SULFATE 325(65) MG
325 TABLET ORAL EVERY OTHER DAY
Qty: 30 TABLET | Refills: 3 | Status: SHIPPED | OUTPATIENT
Start: 2024-06-12

## 2024-06-11 RX ORDER — IBUPROFEN 800 MG/1
800 TABLET ORAL EVERY 8 HOURS
Qty: 120 TABLET | Refills: 0 | Status: SHIPPED | OUTPATIENT
Start: 2024-06-11

## 2024-06-11 RX ORDER — PSEUDOEPHEDRINE HCL 30 MG
100 TABLET ORAL 2 TIMES DAILY
Qty: 90 CAPSULE | Refills: 1 | Status: SHIPPED | OUTPATIENT
Start: 2024-06-11

## 2024-06-11 RX ADMIN — ACETAMINOPHEN 1000 MG: 500 TABLET ORAL at 05:03

## 2024-06-11 RX ADMIN — OXYCODONE HYDROCHLORIDE 5 MG: 5 TABLET ORAL at 02:22

## 2024-06-11 RX ADMIN — DOCUSATE SODIUM 100 MG: 100 CAPSULE, LIQUID FILLED ORAL at 10:23

## 2024-06-11 RX ADMIN — OXYCODONE HYDROCHLORIDE 5 MG: 5 TABLET ORAL at 10:23

## 2024-06-11 RX ADMIN — IBUPROFEN 800 MG: 800 TABLET, FILM COATED ORAL at 01:51

## 2024-06-11 RX ADMIN — IBUPROFEN 800 MG: 800 TABLET, FILM COATED ORAL at 10:24

## 2024-06-11 ASSESSMENT — PAIN - FUNCTIONAL ASSESSMENT
PAIN_FUNCTIONAL_ASSESSMENT: ACTIVITIES ARE NOT PREVENTED

## 2024-06-11 ASSESSMENT — PAIN SCALES - GENERAL
PAINLEVEL_OUTOF10: 6

## 2024-06-11 ASSESSMENT — PAIN DESCRIPTION - LOCATION
LOCATION: ABDOMEN
LOCATION: INCISION
LOCATION: INCISION;BACK

## 2024-06-11 ASSESSMENT — PAIN DESCRIPTION - DESCRIPTORS
DESCRIPTORS: ACHING;DISCOMFORT
DESCRIPTORS: DISCOMFORT
DESCRIPTORS: SORE

## 2024-06-11 ASSESSMENT — PAIN DESCRIPTION - ORIENTATION: ORIENTATION: LOWER

## 2024-06-11 NOTE — FLOWSHEET NOTE
Pt knows she is rubella non-immune and  this RN offered the MMR vaccine and pt declines to have the vaccine

## 2024-06-11 NOTE — DISCHARGE INSTRUCTIONS
minutes at a time for comfort.  Do not express breast milk.  Avoid stimulation to your breasts. When showering, allow the water to strike only your back.  Wear a good fitting bra, such as a sports bra, until your milk dries up.     OTHER REASONS TO CALL YOUR DOCTOR    Your abdomen is tender to touch or you have pain that cannot be relieved.  Flu-like symptoms such as achy muscles and joints or you are experiencing extreme weakness or dizziness.  Persistent burning or increased urgency in urination.      LITERATURE PROVIDED    For Moms and Those who Care about Them.  Your [place] Healthy Start, Grow Smart.  Breastfeeding Best for Baby, Best for Mom.  [facility] Parent Information about Universal Hearing Screening.  Controlling Pain.    I have received the educational material listed above,  [employer] [place] [employer] has provided me with the opportunity to view instructional videos pertaining to infant care and the care of myself.  I verify that I have received the above information and have no further questions and feel confident to care for myself and my baby.    For more information about postpartum care or baby care and feeding, create a [facility] My Chart account, sign in and search using the magnifying glass and type in postpartum, breastfeeding or formula feeding. [email or web address]

## 2024-06-11 NOTE — PROGRESS NOTES
[facility] Ob/Gyn  Post Partum Progress Note    Subjective:   Patient is a 18 y.o. y/o  female S/P PCS for TYRELL at 40w5d EGA. PPD # 3.     Pregnancy has been complicated by pyelonephritis, circumvallate placenta, GBS positive, UTI during pregnancy, uterine size-dates discrepancy, rubella non-immune, varicella non-immune and + THC on first trimester UDS.     Doing well today. No current complaints are noted including headache, change in vision, fever, chills, chest pain, shortness of breath, nausea, vomiting, diarrhea or constipation. The patient denies dizziness with ambulation or calf tenderness. Voiding spontaneously. Lochia is described as moderate. The patient plans to bottle feed.    Objective:   GENERAL APPEARANCE: alert, well appearing, in no apparent distress  LUNGS: Resp effort normal   HEART: Reg rate   ABDOMEN POSTPARTUM: benign non-tender, without masses or organomegaly palpable . Uterine Fundus firm, NT, Below umbilicus.  Incision Dry/Clean/Intact w/ bandage in place.  EXTREMITIES: no redness or tenderness in the calves or thighs, no edema  SKIN: normal coloration and turgor, no rashes, no suspicious skin lesions noted    Pertinent Labs:   H/H: 11.4/34.3 > 9.7/29.8    Assessment / Plan:  1) Post partum    - meeting post partum milestones    - hemodynamically stable     2) O+ / RI / GBS (+)   - s/p PCN in labor     3) Baby Information (Delivered 2024 1253)   MRN: [id number] Bed: 378-01N    Delivering clinician: Ranjana Smith DO GA: 40w5d Delivery method: , Low Transverse   Birth weight: 3.555 kg (7 lb 13.4 oz) Apgars 1, 5, 10 min: 8, 9, -- Forceps?: No   Blood type: O POS            Disposition:   Post Partum Instructions reviewed   DC to Home today     Ranjana Smith DO

## 2024-06-11 NOTE — FLOWSHEET NOTE
Discharge Phone Call    Patient Name: Merissa Whipple     OB Care Provider: Marla Acevedo DO Discharge Date: 2024    Disposition of baby:    Phone Number: [phone] (home)     Attempts to Contact:  Date:    Caller  Date:    Caller  Date:    Caller    Information for the patient's :  Alex Whipple [[id number]]   Delivery Method: , Low Transverse     1.  Now that you are at home is your pain being well controlled?   Y/N   If no, instruct to call       provider.      2. Are you breastfeeding?    Y/N    Do you need any extra support from our lactation staff?      Y/N    If yes, provide number for lactation.  3. Have you made or already had your first appointment with the baby's doctor? Y/N   If no, do      you know when to schedule it?  Y/N    4. Have you scheduled your follow-up appointment?  Y/N  If no, do you know when to schedule       it?    Y/N   If no, they can find it on printed discharge instructions.  5. Did staff discuss safe sleep during your stay? Y/N   6. Did we explain things in a way you could understand?  Y/N  7. Were we respectful of your preferences for labor and birth and include you in the plan of       care?  Y/N  If no, please explain _______________________________________________  8. Is there anyone in particular you would like to mention who provided care for you? _______      _________________________________________________________________________     9. Were you given a Post-Birth Warning Signs handout?  Y/N  Do you have it somewhere      easily accessible?  Y/N  If no, please send them a copy and ask them to put it somewhere      easily found.  10. Have you been crying excessively, having anger or mood swings that feel out of control, or       feel like you can't cope with caring for yourself or baby? Y/N   If yes, they may be showing       signs of postpartum depression and should call provider. There is also a        depression test on page C5

## 2024-06-11 NOTE — FLOWSHEET NOTE
ID bands checked. Infant's ID band and Father's matching ID bands removed and taped to discharge instruction sheet, the mother verified as correct and witnessed by RN.  Umbilical clamp and HUGS tag removed. Mom and  Infant discharged via wheelchair to private car.  Infant placed in car seat per parents.  Mom and baby accompanied by family and in stable condition.

## 2024-06-11 NOTE — DISCHARGE INSTR - DIET

## 2024-06-11 NOTE — DISCHARGE SUMMARY
Department of Obstetrics and Gynecology  Postpartum Discharge Summary      Admit Date: 2024    Admit Diagnosis: Encounter for elective induction of labor [Z34.90]    Discharge Date: 24    Condition at Discharge: good    Discharge Diagnoses: primary C section    Discharge Disposition:  Home    Service: Obstetrics    Postpartum complications: none     Hospital Course: uncomplicated    Geary Data:  Information for the patient's :  Alex Whipple [[id number]]      Weight   Information for the patient's :  Alex Whipple [[id number]]      Apgars   Information for the patient's :  Alex Whipple [[id number]]       Disposition of Baby:  Home with mother    FU in 2 weeks in office    Current Discharge Medication List        START taking these medications    Details   ibuprofen (ADVIL;MOTRIN) 800 MG tablet Take 1 tablet by mouth in the morning and 1 tablet at noon and 1 tablet in the evening.  Qty: 120 tablet, Refills: 0      oxyCODONE (ROXICODONE) 5 MG immediate release tablet Take 1 tablet by mouth every 4 hours as needed for Pain for up to 3 days. Max Daily Amount: 30 mg  Qty: 28 tablet, Refills: 0    Comments: Reduce doses taken as pain becomes manageable  Associated Diagnoses: S/P primary low transverse       ferrous sulfate (IRON 325) 325 (65 Fe) MG tablet Take 1 tablet by mouth every other day  Qty: 30 tablet, Refills: 3      docusate sodium (COLACE, DULCOLAX) 100 MG CAPS Take 100 mg by mouth 2 times daily  Qty: 90 capsule, Refills: 1           CONTINUE these medications which have NOT CHANGED    Details   cephALEXin (KEFLEX) 250 MG capsule Take 1 capsule by mouth daily  Qty: 30 capsule, Refills: 3      Prenatal Vit-Fe Fumarate-FA (PRENATAL VITAMIN PO) Take by mouth             Electronically signed by Ranjana Smith DO on 2024 at 8:53 AM

## 2024-06-11 NOTE — PLAN OF CARE
Problem: Pain  Goal: Verbalizes/displays adequate comfort level or baseline comfort level  6/11/2024 1159 by Jessica Thompson RN  Outcome: Completed  6/11/2024 0023 by Darlin Rivera RN  Outcome: Progressing     Problem: Infection - Adult  Goal: Absence of infection at discharge  6/11/2024 1159 by Jessica Thompson RN  Outcome: Completed  6/11/2024 0023 by Darlin Rivera RN  Outcome: Progressing  Goal: Absence of infection during hospitalization  6/11/2024 1159 by Jessica Thopmson RN  Outcome: Completed  6/11/2024 0023 by Darlin Rivera RN  Outcome: Progressing  Goal: Absence of fever/infection during anticipated neutropenic period  6/11/2024 1159 by Jessica Thompson RN  Outcome: Completed  6/11/2024 0023 by Darlin Rivera RN  Outcome: Progressing     Problem: Discharge Planning  Goal: Discharge to home or other facility with appropriate resources  6/11/2024 1159 by Jessica Thompson RN  Outcome: Completed  6/11/2024 0023 by Darlin Rivera RN  Outcome: Progressing     Problem: Chronic Conditions and Co-morbidities  Goal: Patient's chronic conditions and co-morbidity symptoms are monitored and maintained or improved  6/11/2024 1159 by Jessica Thompson RN  Outcome: Completed  6/11/2024 0023 by Darlin Rivera RN  Outcome: Progressing     
  Problem: Pain  Goal: Verbalizes/displays adequate comfort level or baseline comfort level  6/7/2024 2314 by Nicole Regan RN  Outcome: Progressing     Problem: Safety - Adult  Goal: Free from fall injury  6/7/2024 2314 by Nicole Regan, RN  Outcome: Progressing     Problem: Discharge Planning  Goal: Discharge to home or other facility with appropriate resources  6/7/2024 2314 by Nicole Regan, RN  Outcome: Progressing     
  Problem: Pain  Goal: Verbalizes/displays adequate comfort level or baseline comfort level  Outcome: Progressing     Problem: Infection - Adult  Goal: Absence of infection at discharge  Outcome: Progressing  Goal: Absence of infection during hospitalization  Outcome: Progressing  Goal: Absence of fever/infection during anticipated neutropenic period  Outcome: Progressing     Problem: Safety - Adult  Goal: Free from fall injury  Outcome: Progressing     
  Problem: Pain  Goal: Verbalizes/displays adequate comfort level or baseline comfort level  Outcome: Progressing     Problem: Infection - Adult  Goal: Absence of infection at discharge  Outcome: Progressing  Goal: Absence of infection during hospitalization  Outcome: Progressing  Goal: Absence of fever/infection during anticipated neutropenic period  Outcome: Progressing     Problem: Safety - Adult  Goal: Free from fall injury  Outcome: Progressing     Problem: Discharge Planning  Goal: Discharge to home or other facility with appropriate resources  Outcome: Progressing     
  Problem: Pain  Goal: Verbalizes/displays adequate comfort level or baseline comfort level  Outcome: Progressing     Problem: Infection - Adult  Goal: Absence of infection at discharge  Outcome: Progressing  Goal: Absence of infection during hospitalization  Outcome: Progressing  Goal: Absence of fever/infection during anticipated neutropenic period  Outcome: Progressing     Problem: Safety - Adult  Goal: Free from fall injury  Outcome: Progressing     Problem: Discharge Planning  Goal: Discharge to home or other facility with appropriate resources  Outcome: Progressing     Problem: Chronic Conditions and Co-morbidities  Goal: Patient's chronic conditions and co-morbidity symptoms are monitored and maintained or improved  Outcome: Progressing     
  Problem: Vaginal Birth or  Section  Goal: Fetal and maternal status remain reassuring during the birth process  Description:  Birth OB-Pregnancy care plan goal which identifies if the fetal and maternal status remain reassuring during the birth process  2024 by Yola Pedraza RN  Outcome: Completed     Problem: Pain  Goal: Verbalizes/displays adequate comfort level or baseline comfort level  2024 by Enma Araujo RN  Outcome: Progressing  2024 by Yola Pedraza RN  Outcome: Progressing     Problem: Infection - Adult  Goal: Absence of infection at discharge  2024 by Enma Araujo RN  Outcome: Progressing  2024 by Yola Pedraza RN  Outcome: Progressing  Goal: Absence of infection during hospitalization  2024 by Enma Araujo RN  Outcome: Progressing  2024 by Yola Pedraza RN  Outcome: Progressing  Goal: Absence of fever/infection during anticipated neutropenic period  2024 by Enma Araujo RN  Outcome: Progressing  2024 by Yola Pedraza RN  Outcome: Progressing     Problem: Safety - Adult  Goal: Free from fall injury  2024 by Enma Araujo RN  Outcome: Progressing  2024 by Yola Pedraza RN  Outcome: Progressing     Problem: Discharge Planning  Goal: Discharge to home or other facility with appropriate resources  2024 by Enma Araujo RN  Outcome: Progressing  2024 by Yola Pedraza RN  Outcome: Progressing     Problem: Chronic Conditions and Co-morbidities  Goal: Patient's chronic conditions and co-morbidity symptoms are monitored and maintained or improved  Outcome: Progressing     
  Problem: Vaginal Birth or  Section  Goal: Fetal and maternal status remain reassuring during the birth process  Description:  Birth OB-Pregnancy care plan goal which identifies if the fetal and maternal status remain reassuring during the birth process  Outcome: Progressing     Problem: Pain  Goal: Verbalizes/displays adequate comfort level or baseline comfort level  Outcome: Progressing     Problem: Infection - Adult  Goal: Absence of infection at discharge  Outcome: Progressing  Goal: Absence of infection during hospitalization  Outcome: Progressing  Goal: Absence of fever/infection during anticipated neutropenic period  Outcome: Progressing     Problem: Safety - Adult  Goal: Free from fall injury  Outcome: Progressing     Problem: Discharge Planning  Goal: Discharge to home or other facility with appropriate resources  Outcome: Progressing     Problem: Chronic Conditions and Co-morbidities  Goal: Patient's chronic conditions and co-morbidity symptoms are monitored and maintained or improved  Outcome: Progressing     
Patient's chronic conditions and co-morbidity symptoms are monitored and maintained or improved  6/9/2024 0814 by Aggie Blevins, RN  Outcome: Progressing  6/9/2024 0726 by Enma Araujo, RN  Outcome: Progressing

## 2024-06-11 NOTE — DISCHARGE INSTR - ACTIVITY
Call for increased pain, significant vaginal bleeding (soaking through 2 pads in < 1hr) or temperature greater than 101 degrees F.    Nothing in vagina for 6 weeks (pelvic rest), including intercourse, tampons, toys, fingers.   Advance activity as tolerated but limit lifting <10 lbs or weight of baby in carrier for first 2 weeks.   Continue PNV.   Take PRN medications as prescribed.   FU in office in 2 weeks.     Normal Diet     Please call with questions or concerns.   Ranjana Smith, DO

## 2024-06-17 ENCOUNTER — POSTPARTUM VISIT (OUTPATIENT)
Dept: OBGYN CLINIC | Age: 19
End: 2024-06-17
Payer: COMMERCIAL

## 2024-06-17 VITALS
DIASTOLIC BLOOD PRESSURE: 76 MMHG | BODY MASS INDEX: 30.95 KG/M2 | TEMPERATURE: 98.7 F | SYSTOLIC BLOOD PRESSURE: 124 MMHG | WEIGHT: 186 LBS | HEART RATE: 84 BPM

## 2024-06-17 DIAGNOSIS — R23.9 ALTERATION IN SKIN INTEGRITY RELATED TO SURGICAL INCISION: ICD-10-CM

## 2024-06-17 PROCEDURE — 99213 OFFICE O/P EST LOW 20 MIN: CPT | Performed by: OBSTETRICS & GYNECOLOGY

## 2024-06-17 RX ORDER — CEPHALEXIN 500 MG/1
500 CAPSULE ORAL 2 TIMES DAILY
Qty: 14 CAPSULE | Refills: 0 | Status: SHIPPED | OUTPATIENT
Start: 2024-06-17 | End: 2024-06-24

## 2024-06-17 NOTE — PROGRESS NOTES
Ranjana L, DO       Objective:  /76 (Site: Left Upper Arm, Position: Sitting, Cuff Size: Medium Adult)   Pulse 84   Temp 98.7 °F (37.1 °C) (Infrared)   Wt 84.4 kg (186 lb)   LMP 2023   Breastfeeding No   BMI 30.95 kg/m²     Physical Exam  Vitals reviewed.   Constitutional:       General: She is not in acute distress.     Appearance: She is well-developed.   HENT:      Head: Normocephalic and atraumatic.   Eyes:      Conjunctiva/sclera: Conjunctivae normal.   Cardiovascular:      Rate and Rhythm: Normal rate.   Pulmonary:      Effort: Pulmonary effort is normal. No respiratory distress.   Abdominal:      General: There is no distension.      Palpations: Abdomen is soft.      Tenderness: There is no abdominal tenderness. There is no guarding or rebound.      Comments: Steri-strips removed.  Small area of incision with superficial skin separation.  Cleansed with betadine. Probed and found to be only superficial.  Steri-strip reapplied. No evidence of drainage or surrounding infection.    Musculoskeletal:         General: Normal range of motion.   Skin:     General: Skin is warm and dry.   Neurological:      Mental Status: She is alert and oriented to person, place, and time.   Psychiatric:         Behavior: Behavior normal.         Thought Content: Thought content normal.          Assessment/Plan:     Merissa Whipple is a 19 y.o.  s/p  Section on 2024 here for her postpartum visit:    1. Postpartum care following  delivery     - Patient is doing well today     - Meeting milestones     - Maternal wellness questionnaire reviewed - no concerns today - mild mood changes, finding manageable.  Denies SI/HI     - Infant is doing well     - Formula feeding     - Postpartum precautions reviewed     - Return precautions reviewed     - Will follow-up in 1 week for incision check with Dr. Smith     2. Alteration in skin integrity related to surgical incision     - Small are of skin

## 2024-06-26 ENCOUNTER — POSTPARTUM VISIT (OUTPATIENT)
Dept: OBGYN CLINIC | Age: 19
End: 2024-06-26
Payer: COMMERCIAL

## 2024-06-26 VITALS
DIASTOLIC BLOOD PRESSURE: 70 MMHG | TEMPERATURE: 97.8 F | HEART RATE: 80 BPM | BODY MASS INDEX: 29.79 KG/M2 | WEIGHT: 179 LBS | SYSTOLIC BLOOD PRESSURE: 116 MMHG

## 2024-06-26 DIAGNOSIS — O43.113 CIRCUMVALLATE PLACENTA IN THIRD TRIMESTER: ICD-10-CM

## 2024-06-26 DIAGNOSIS — O48.0 POST-TERM PREGNANCY, 40-42 WEEKS OF GESTATION: ICD-10-CM

## 2024-06-26 DIAGNOSIS — Z30.011 ENCOUNTER FOR INITIAL PRESCRIPTION OF CONTRACEPTIVE PILLS: ICD-10-CM

## 2024-06-26 DIAGNOSIS — Z98.891 S/P PRIMARY LOW TRANSVERSE C-SECTION: ICD-10-CM

## 2024-06-26 PROCEDURE — 99213 OFFICE O/P EST LOW 20 MIN: CPT | Performed by: OBSTETRICS & GYNECOLOGY

## 2024-06-26 RX ORDER — LEVONORGESTREL AND ETHINYL ESTRADIOL 0.15-0.03
1 KIT ORAL DAILY
Qty: 1 PACKET | Refills: 3 | Status: SHIPPED | OUTPATIENT
Start: 2024-06-26

## 2024-06-26 NOTE — PROGRESS NOTES
OB Postpartum Visit    HPI:   Patient is a 19 y.o.  s/p  Section here for her postpartum visit:     Pt doing well today. Bleeding thin lochia. Bowel function is normal. Bladder function is normal.   Patient is not sexually active.   Contraception method is none.   EDPS: negative. MWQ 6.   Baby has been doing well without problems.   Baby is feeding bottle.     Review of Systems - The following ROS was otherwise negative, except as noted in the HPI: constitutional, respiratory, cardiovascular, gastrointestinal, genitourinary    OB History  OB History    Para Term  AB Living   1 1 1 0 0 1   SAB IAB Ectopic Molar Multiple Live Births   0 0 0 0 0 1      # Outcome Date GA Lbr David/2nd Weight Sex Delivery Anes PTL Lv   1 Term 24 40w5d  3.555 kg (7 lb 13.4 oz) M CS-LTranv EPI N ROSE MARIE      Complications: Fetal Intolerance       Medications:  Current Outpatient Medications on File Prior to Visit   Medication Sig Dispense Refill    ferrous sulfate (IRON 325) 325 (65 Fe) MG tablet Take 1 tablet by mouth every other day 30 tablet 3    ibuprofen (ADVIL;MOTRIN) 800 MG tablet Take 1 tablet by mouth in the morning and 1 tablet at noon and 1 tablet in the evening. (Patient not taking: Reported on 2024) 120 tablet 0    docusate sodium (COLACE, DULCOLAX) 100 MG CAPS Take 100 mg by mouth 2 times daily (Patient not taking: Reported on 2024) 90 capsule 1     Current Facility-Administered Medications on File Prior to Visit   Medication Dose Route Frequency Provider Last Rate Last Admin    ePHEDrine injection   IntraVENous PRN Shanel Robin APRN - CRNA   35 mg at 24 1414        Objective:  /70 (Site: Right Upper Arm, Position: Sitting, Cuff Size: Medium Adult)   Pulse 80   Temp 97.8 °F (36.6 °C) (Infrared)   Wt 81.2 kg (179 lb)   LMP 2023   Breastfeeding No   BMI 29.79 kg/m²   General: Alert, well appearing, no acute distress  Lungs: Resp effort normal   CV: Regular

## 2024-07-29 ENCOUNTER — POSTPARTUM VISIT (OUTPATIENT)
Dept: OBGYN CLINIC | Age: 19
End: 2024-07-29
Payer: COMMERCIAL

## 2024-07-29 VITALS
WEIGHT: 178.4 LBS | HEART RATE: 102 BPM | DIASTOLIC BLOOD PRESSURE: 64 MMHG | BODY MASS INDEX: 29.69 KG/M2 | TEMPERATURE: 97.4 F | SYSTOLIC BLOOD PRESSURE: 118 MMHG

## 2024-07-29 DIAGNOSIS — O48.0 POST-TERM PREGNANCY, 40-42 WEEKS OF GESTATION: ICD-10-CM

## 2024-07-29 DIAGNOSIS — O43.113 CIRCUMVALLATE PLACENTA IN THIRD TRIMESTER: ICD-10-CM

## 2024-07-29 DIAGNOSIS — Z98.891 S/P PRIMARY LOW TRANSVERSE C-SECTION: ICD-10-CM

## 2024-07-29 NOTE — PROGRESS NOTES
OB Postpartum Visit    HPI:   Patient is a 19 y.o.  s/p  Section here for her postpartum visit:     Pt doing well today. Bleeding thin lochia. Bowel function is normal. Bladder function is normal.   Patient is sexually active, no issues   Contraception method is none }ocps doing well   EDPS: negative.   Baby has been doing well without problems.   Baby is feeding bottle (Enfamil).     Review of Systems - The following ROS was otherwise negative, except as noted in the HPI: constitutional, respiratory, cardiovascular, gastrointestinal, genitourinary    OB History  OB History    Para Term  AB Living   1 1 1 0 0 1   SAB IAB Ectopic Molar Multiple Live Births   0 0 0 0 0 1      # Outcome Date GA Lbr David/2nd Weight Sex Delivery Anes PTL Lv   1 Term 24 40w5d  3.555 kg (7 lb 13.4 oz) M CS-LTranv EPI N ROSE MARIE      Complications: Fetal Intolerance       Medications:  Current Outpatient Medications on File Prior to Visit   Medication Sig Dispense Refill    levonorgestrel-ethinyl estradiol (SEASONALE) 0.15-0.03 MG per tablet Take 1 tablet by mouth daily 1 packet 3    ferrous sulfate (IRON 325) 325 (65 Fe) MG tablet Take 1 tablet by mouth every other day 30 tablet 3     Current Facility-Administered Medications on File Prior to Visit   Medication Dose Route Frequency Provider Last Rate Last Admin    ePHEDrine injection   IntraVENous PRN Shanel Robin APRN - CRNA   35 mg at 24 1414        Objective:  /64 (Site: Left Upper Arm, Position: Sitting, Cuff Size: Medium Adult)   Pulse (!) 102   Temp 97.4 °F (36.3 °C) (Infrared)   Wt 80.9 kg (178 lb 6.4 oz)   LMP 2023   BMI 29.69 kg/m²   General: Alert, well appearing, no acute distress  Lungs: Resp effort normal   CV: Regular rate  Abdomen: Soft, nontender, nondistended     Incision well healed / clean, dry and intact.   Pelvic: Deferred   Extremities: No redness or tenderness, neg Yasmeen's sign  Osteopathic: no TART

## 2024-11-20 ENCOUNTER — OFFICE VISIT (OUTPATIENT)
Dept: OBGYN CLINIC | Age: 19
End: 2024-11-20
Payer: COMMERCIAL

## 2024-11-20 VITALS
OXYGEN SATURATION: 98 % | DIASTOLIC BLOOD PRESSURE: 70 MMHG | BODY MASS INDEX: 27.99 KG/M2 | WEIGHT: 168 LBS | HEART RATE: 98 BPM | HEIGHT: 65 IN | SYSTOLIC BLOOD PRESSURE: 110 MMHG

## 2024-11-20 DIAGNOSIS — Z12.4 SCREENING FOR CERVICAL CANCER: ICD-10-CM

## 2024-11-20 DIAGNOSIS — Z01.419 WOMEN'S ANNUAL ROUTINE GYNECOLOGICAL EXAMINATION: Primary | ICD-10-CM

## 2024-11-20 DIAGNOSIS — R30.0 DYSURIA: ICD-10-CM

## 2024-11-20 PROCEDURE — 99395 PREV VISIT EST AGE 18-39: CPT | Performed by: OBSTETRICS & GYNECOLOGY

## 2024-11-20 ASSESSMENT — ENCOUNTER SYMPTOMS
BACK PAIN: 1
CONSTIPATION: 0
DIARRHEA: 0
BLOOD IN STOOL: 0

## 2024-11-20 NOTE — PROGRESS NOTES
change.   HENT:  Positive for congestion.    Cardiovascular:  Negative for chest pain.   Gastrointestinal:  Negative for blood in stool, constipation and diarrhea.   Endocrine: Negative for cold intolerance and heat intolerance.   Genitourinary:  Negative for dyspareunia, pelvic pain and vaginal discharge.   Musculoskeletal:  Positive for back pain. Negative for arthralgias and neck pain.   Skin:  Negative for rash.   Neurological:  Negative for dizziness and headaches.   Psychiatric/Behavioral:  Negative for sleep disturbance. The patient is not nervous/anxious.         Objective:  Body mass index is 27.96 kg/m².  /70   Pulse 98   Ht 1.651 m (5' 5\")   Wt 76.2 kg (168 lb)   LMP 09/23/2024 (Approximate)   SpO2 98%   BMI 27.96 kg/m²     Exam:   Physical Exam  Vitals and nursing note reviewed. Exam conducted with a chaperone present.   Constitutional:       Appearance: Normal appearance.   HENT:      Head: Normocephalic and atraumatic.   Eyes:      General:         Right eye: No discharge.         Left eye: No discharge.      Conjunctiva/sclera: Conjunctivae normal.   Cardiovascular:      Rate and Rhythm: Normal rate and regular rhythm.      Heart sounds: Normal heart sounds.   Pulmonary:      Effort: Pulmonary effort is normal.      Breath sounds: No stridor. No wheezing or rhonchi.   Chest:   Breasts:     Right: No inverted nipple, mass, nipple discharge, skin change or tenderness.      Left: No inverted nipple, mass, nipple discharge, skin change or tenderness.   Abdominal:      General: Abdomen is flat.      Palpations: Abdomen is soft.      Tenderness: There is no abdominal tenderness. There is no guarding or rebound.   Genitourinary:     General: Normal vulva.      Exam position: Lithotomy position.      Labia:         Right: No tenderness or lesion.         Left: No tenderness or lesion.       Vagina: No vaginal discharge, tenderness or bleeding.      Cervix: No discharge, friability or lesion.

## 2024-11-22 LAB
BACTERIA UR CULT: ABNORMAL
ORGANISM: ABNORMAL

## 2025-01-16 ENCOUNTER — APPOINTMENT (OUTPATIENT)
Dept: GENERAL RADIOLOGY | Age: 20
End: 2025-01-16
Payer: COMMERCIAL

## 2025-01-16 ENCOUNTER — HOSPITAL ENCOUNTER (EMERGENCY)
Age: 20
Discharge: HOME | End: 2025-01-16
Payer: COMMERCIAL

## 2025-01-16 VITALS
HEIGHT: 68 IN | RESPIRATION RATE: 16 BRPM | TEMPERATURE: 99.1 F | BODY MASS INDEX: 25.01 KG/M2 | OXYGEN SATURATION: 97 % | SYSTOLIC BLOOD PRESSURE: 112 MMHG | HEART RATE: 91 BPM | WEIGHT: 165 LBS | DIASTOLIC BLOOD PRESSURE: 74 MMHG

## 2025-01-16 DIAGNOSIS — M25.561 ACUTE PAIN OF RIGHT KNEE: Primary | ICD-10-CM

## 2025-01-16 PROCEDURE — 73562 X-RAY EXAM OF KNEE 3: CPT

## 2025-01-16 PROCEDURE — 99283 EMERGENCY DEPT VISIT LOW MDM: CPT

## 2025-01-16 RX ORDER — DICLOFENAC SODIUM 75 MG/1
75 TABLET, DELAYED RELEASE ORAL 2 TIMES DAILY
Qty: 14 TABLET | Refills: 0 | Status: SHIPPED | OUTPATIENT
Start: 2025-01-16 | End: 2025-01-23

## 2025-01-16 ASSESSMENT — PAIN DESCRIPTION - ORIENTATION: ORIENTATION: RIGHT

## 2025-01-16 ASSESSMENT — PAIN - FUNCTIONAL ASSESSMENT: PAIN_FUNCTIONAL_ASSESSMENT: 0-10

## 2025-01-16 ASSESSMENT — PAIN SCALES - GENERAL: PAINLEVEL_OUTOF10: 5

## 2025-01-16 ASSESSMENT — PAIN DESCRIPTION - LOCATION: LOCATION: KNEE

## 2025-01-20 NOTE — ED PROVIDER NOTES
[facility] EMERGENCY DEPARTMENT  EMERGENCY DEPARTMENT ENCOUNTER        Pt Name: Merissa Whipple  MRN: [id number]  Birthdate 2005  Date of evaluation: 2025  Provider: ROBINSON Foote CNP  PCP: Corina Mishra APRN - CNP        ANDRES. I have evaluated this patient.        CHIEF COMPLAINT       Chief Complaint   Patient presents with    Knee Pain     Patient slipped on last step, fell onto right knee approx 1 hour PTA.   Patient took extra strength ibuprofen before coming in.        HISTORY OF PRESENT ILLNESS: 1 or more Elements     History From: the patient  Limitations to history : None    Merissa Whipple is a 19 y.o. female who presents to the ER today with complaints of knee pain.  Patient came in with knee pain after a fall.  Patient states that have about an hour prior to coming in.  She is here for further evaluation    Nursing Notes were all reviewed and agreed with or any disagreements were addressed in the HPI.    REVIEW OF SYSTEMS :      Review of Systems    Positives and Pertinent negatives as per HPI.     SURGICAL HISTORY     Past Surgical History:   Procedure Laterality Date     SECTION N/A 2024     SECTION performed by Ranjana Smith DO at [facility] L[facility]D OR       CURRENTMEDICATIONS       Discharge Medication List as of 2025  1:25 PM        CONTINUE these medications which have NOT CHANGED    Details   levonorgestrel-ethinyl estradiol (SEASONALE) 0.15-0.03 MG per tablet Take 1 tablet by mouth daily, Disp-1 packet, R-3Normal             ALLERGIES     Patient has no known allergies.    FAMILYHISTORY       Family History   Problem Relation Age of Onset    Cervical Cancer Mother     Cancer Mother         SOCIAL HISTORY       Social History     Tobacco Use    Smoking status: Never    Smokeless tobacco: Never   Vaping Use    Vaping status: Former    Substances: Nicotine   Substance Use Topics    Alcohol use: Not Currently    Drug use: Yes     Types:

## 2025-01-30 ENCOUNTER — OFFICE VISIT (OUTPATIENT)
Age: 20
End: 2025-01-30

## 2025-01-30 VITALS
DIASTOLIC BLOOD PRESSURE: 60 MMHG | OXYGEN SATURATION: 98 % | WEIGHT: 165 LBS | BODY MASS INDEX: 25.01 KG/M2 | TEMPERATURE: 97.4 F | HEART RATE: 80 BPM | SYSTOLIC BLOOD PRESSURE: 100 MMHG | HEIGHT: 68 IN

## 2025-01-30 DIAGNOSIS — S89.90XA KNEE INJURY, INITIAL ENCOUNTER: ICD-10-CM

## 2025-01-30 DIAGNOSIS — M22.01 RECURRENT DISLOCATION OF PATELLA, RIGHT: Primary | ICD-10-CM

## 2025-01-30 DIAGNOSIS — M25.561 ACUTE PAIN OF RIGHT KNEE: ICD-10-CM

## 2025-01-30 NOTE — PROGRESS NOTES
Merissa Whipple (:  2005) is a 19 y.o. female,  here for evaluation of the following chief complaint(s): Dislocation (Knee-right. Can't get into ortho due to insurance. In pain. )    Merissa Whipple is a: New patient.   Last Urgent Care Visit: Visit date not found  I have reviewed the patient's medications and basic medical history; see Medication Reconciliation.    ASSESSMENT/PLAN:  Diagnosis:     ICD-10-CM    1. Recurrent dislocation of patella, right  M22.01       2. Knee injury, initial encounter  S89.90XA diclofenac sodium (VOLTAREN) 1 % GEL      3. Acute pain of right knee  M25.561              Medical Decision Making:   Patient was seen at Urgent Care today for continued right knee pain after patellar dislocation and knee injury on 25.  She has history of previous patellar dislocations.  She has had several dislocations of the right patella and at least one of the left.  This normally will either self reduce or easily reduce and she will have only a few days of symptoms but this time pain has persisted.    On exam knee is stable.  There is some mild tenderness over the patella as well as the patella tendon.  There is also noted pain with full extension.  Remainder of exam is otherwise unremarkable.    Patient has already had x-ray which was performed on 2025.  Results were reviewed and x-ray was unremarkable.    She has tried follow-up with Ortho since he but they did not take her insurance.  She also tried [facility] but again was having issues with her insurance.    There is some concern that she may have had additional meniscal injury in addition to the patellar dislocation.  She has a soft knee brace at home but states this was not beneficial and actually made her knee pain worse so she has not been wearing this. Therefore, we will avoid knee immobilizer/brace here. She does have crutches at home which she will use as needed.    Patient certainly does need to follow-up with

## 2025-01-30 NOTE — PATIENT INSTRUCTIONS
Knee brace if tolerated.     Follow-up with orthopaedics. Call office. Explain you were seen at urgent care with right knee pain and recurrent knee cap (patella) dislocations and advised follow-up with them. They will schedule you appropriately.     [facility]  [phone]      [facility]  [phone]     [facility]   [phone]

## 2025-06-26 DIAGNOSIS — Z30.011 ENCOUNTER FOR INITIAL PRESCRIPTION OF CONTRACEPTIVE PILLS: ICD-10-CM

## 2025-06-26 RX ORDER — LEVONORGESTREL AND ETHINYL ESTRADIOL 0.15-0.03
1 KIT ORAL DAILY
Qty: 91 TABLET | Refills: 3 | Status: SHIPPED | OUTPATIENT
Start: 2025-06-26

## (undated) DEVICE — Z INACTIVE USE 2660665 SOLUTION IRRIG 1000ML 0.9% SOD CHL USP POUR PLAS BTL

## (undated) DEVICE — SUTURE VICRYL SZ 0 L36IN ABSRB UD L36MM CT-1 1/2 CIR J946H

## (undated) DEVICE — Device

## (undated) DEVICE — Z INACTIVE NO ACTIVE SUPPLIER APPLICATOR MEDICATED 26 CC TINT HI-LITE ORNG STRL CHLORAPREP

## (undated) DEVICE — SUTURE VICRYL SZ 1 L36IN ABSRB VLT L36MM CT-1 1/2 CIR J347H

## (undated) DEVICE — 3M™ STERI-STRIP™ COMPOUND BENZOIN TINCTURE 40 BAGS/CARTON 4 CARTONS/CASE C1544: Brand: 3M™ STERI-STRIP™

## (undated) DEVICE — GLOVE SURG SZ 65 THK91MIL LTX FREE SYN POLYISOPRENE

## (undated) DEVICE — PAD,NON-ADHERENT,3X8,STERILE,LF,1/PK: Brand: MEDLINE

## (undated) DEVICE — BLADE CLIPPER GEN PURP NS

## (undated) DEVICE — DRESSING COMP IS W4XL10IN PD W2XL8IN CNTCT LAYR ADH

## (undated) DEVICE — SUTURE MONOCRYL SZ 3-0 L27IN ABSRB UD L60MM KS STR REV CUT Y523H

## (undated) DEVICE — TRAY URIN CATH 16FR DRNGE BG STATLOK STBL DEV F SURSTP

## (undated) DEVICE — SUTURE ABSORBABLE BRAIDED 2-0 CT-1 27 IN UD VICRYL J259H

## (undated) DEVICE — S/USE RESUS KIT W/O MASK (10): Brand: FISHER & PAYKEL HEALTHCARE

## (undated) DEVICE — GARMENT COMPR L FOR 23IN CALF FLOTRN